# Patient Record
Sex: FEMALE | Race: BLACK OR AFRICAN AMERICAN | Employment: FULL TIME | URBAN - METROPOLITAN AREA
[De-identification: names, ages, dates, MRNs, and addresses within clinical notes are randomized per-mention and may not be internally consistent; named-entity substitution may affect disease eponyms.]

---

## 2017-07-31 ENCOUNTER — OFFICE VISIT (OUTPATIENT)
Dept: SURGERY | Age: 62
End: 2017-07-31

## 2017-07-31 VITALS
OXYGEN SATURATION: 100 % | BODY MASS INDEX: 40.32 KG/M2 | HEART RATE: 70 BPM | DIASTOLIC BLOOD PRESSURE: 81 MMHG | SYSTOLIC BLOOD PRESSURE: 135 MMHG | WEIGHT: 242 LBS | HEIGHT: 65 IN | RESPIRATION RATE: 16 BRPM

## 2017-07-31 DIAGNOSIS — M54.9 CHRONIC BACK PAIN, UNSPECIFIED BACK LOCATION, UNSPECIFIED BACK PAIN LATERALITY: ICD-10-CM

## 2017-07-31 DIAGNOSIS — E66.01 MORBID OBESITY DUE TO EXCESS CALORIES (HCC): Primary | ICD-10-CM

## 2017-07-31 DIAGNOSIS — E78.00 HYPERCHOLESTEROLEMIA: ICD-10-CM

## 2017-07-31 DIAGNOSIS — M25.569 ARTHRALGIA OF KNEE, UNSPECIFIED LATERALITY: ICD-10-CM

## 2017-07-31 DIAGNOSIS — K21.9 GASTROESOPHAGEAL REFLUX DISEASE WITHOUT ESOPHAGITIS: ICD-10-CM

## 2017-07-31 DIAGNOSIS — G89.29 CHRONIC BACK PAIN, UNSPECIFIED BACK LOCATION, UNSPECIFIED BACK PAIN LATERALITY: ICD-10-CM

## 2017-07-31 DIAGNOSIS — E66.01 MORBID OBESITY WITH BMI OF 40.0-44.9, ADULT (HCC): ICD-10-CM

## 2017-07-31 NOTE — PATIENT INSTRUCTIONS
Body Mass Index: Care Instructions  Your Care Instructions    Body mass index (BMI) can help you see if your weight is raising your risk for health problems. It uses a formula to compare how much you weigh with how tall you are. · A BMI lower than 18.5 is considered underweight. · A BMI between 18.5 and 24.9 is considered healthy. · A BMI between 25 and 29.9 is considered overweight. A BMI of 30 or higher is considered obese. If your BMI is in the normal range, it means that you have a lower risk for weight-related health problems. If your BMI is in the overweight or obese range, you may be at increased risk for weight-related health problems, such as high blood pressure, heart disease, stroke, arthritis or joint pain, and diabetes. If your BMI is in the underweight range, you may be at increased risk for health problems such as fatigue, lower protection (immunity) against illness, muscle loss, bone loss, hair loss, and hormone problems. BMI is just one measure of your risk for weight-related health problems. You may be at higher risk for health problems if you are not active, you eat an unhealthy diet, or you drink too much alcohol or use tobacco products. Follow-up care is a key part of your treatment and safety. Be sure to make and go to all appointments, and call your doctor if you are having problems. It's also a good idea to know your test results and keep a list of the medicines you take. How can you care for yourself at home? · Practice healthy eating habits. This includes eating plenty of fruits, vegetables, whole grains, lean protein, and low-fat dairy. · If your doctor recommends it, get more exercise. Walking is a good choice. Bit by bit, increase the amount you walk every day. Try for at least 30 minutes on most days of the week. · Do not smoke. Smoking can increase your risk for health problems. If you need help quitting, talk to your doctor about stop-smoking programs and medicines. These can increase your chances of quitting for good. · Limit alcohol to 2 drinks a day for men and 1 drink a day for women. Too much alcohol can cause health problems. If you have a BMI higher than 25  · Your doctor may do other tests to check your risk for weight-related health problems. This may include measuring the distance around your waist. A waist measurement of more than 40 inches in men or 35 inches in women can increase the risk of weight-related health problems. · Talk with your doctor about steps you can take to stay healthy or improve your health. You may need to make lifestyle changes to lose weight and stay healthy, such as changing your diet and getting regular exercise. If you have a BMI lower than 18.5  · Your doctor may do other tests to check your risk for health problems. · Talk with your doctor about steps you can take to stay healthy or improve your health. You may need to make lifestyle changes to gain or maintain weight and stay healthy, such as getting more healthy foods in your diet and doing exercises to build muscle. Where can you learn more? Go to http://freddie-derek.info/. Enter S176 in the search box to learn more about \"Body Mass Index: Care Instructions. \"  Current as of: January 23, 2017  Content Version: 11.3  © 2353-9521 [employer]. Care instructions adapted under license by [employer] (which disclaims liability or warranty for this information). If you have questions about a medical condition or this instruction, always ask your healthcare professional. [place] [address] any warranty or liability for your use of this information.

## 2017-07-31 NOTE — PROGRESS NOTES
Bariatric Surgery Consultation    Subjective: Delvin Oliveira is a 59 y.o. obese female with a Body mass index is 40.1 kg/(m^2). .  she desires surgery at this time because of health issues and quality of life issues. Delvin Oliveira has tried multiple diets in her lifetime most recently tried physician supervised, behavior modification and unsupervised diets. Bariatric comorbidities present are   Patient Active Problem List   Diagnosis Code    Morbid obesity with BMI of 40.0-44.9, adult ([facility] [address] E66.01, Z68.41    Hypercholesterolemia E78.00    Chronic back pain M54.9, G89.29    Snores R06.83    Morbid obesity (HCC) E66.01    GERD (gastroesophageal reflux disease) K21.9    Arthritic-like pain M25.50     The patient desires laparoscopic sleeve gastrectomy for surgical weight loss, however she is not currently a surgical candidate due to need for review of all criteria. Delvin Oliveira is here today to check progress with weight loss / evaluate nutritional status and review all subspecialty clearances in hopes of proceeding to the operating room. Patient Active Problem List    Diagnosis Date Noted    Morbid obesity ([address]     GERD (gastroesophageal reflux disease)     Arthritic-like pain     Morbid obesity with BMI of 40.0-44.9, adult ([facility] [address]     Hypercholesterolemia     Chronic back pain     Snores       Past Surgical History:   Procedure Laterality Date    HX LIPOSUCTION      HX TOTAL ABDOMINAL HYSTERECTOMY        Social History   Substance Use Topics    Smoking status: Never Smoker    Smokeless tobacco: Never Used    Alcohol use No      No family history on file.      No Known Allergies       Review of Systems:        General - No history or complaints of unexpected fever, chills, or weight loss  Head/Neck - No history or complaints of headache, diplopia, dysphagia, hearing loss  Cardiac - No history or complaints of chest pain, palpitations, murmur, or shortness of breath  Pulmonary - No history or complaints of shortness of breath, productive cough, hemoptysis  Gastrointestinal - (+) GERD, No history or complaints of abdominal pain, obstipation/constipation, blood per rectum  Genitourinary - No history or complaints of hematuria/dysuria, stress urinary incontinence symptoms, or renal lithiasis  Musculoskeletal - (+) arthritic like pains  Hematologic - No history or complaints of bleeding disorders, blood transfusions, sickle cell anemia  Neurologic - No history or complaints of  migraine headaches, seizure activity, syncopal episodes, TIA or stroke  Integumentary - No history or complaints of rashes, abnormal nevi, skin cancer  Gynecological - post hysterectomy    Objective:     Visit Vitals    /81 (BP 1 Location: Left arm, BP Patient Position: Sitting)    Pulse 70    Resp 16    Ht 5' 5\" (1.651 m)    Wt 109.3 kg (241 lb)    SpO2 100%    BMI 40.1 kg/m2       Physical Examination: General appearance - alert, well appearing, and in no distress  Mental status - alert, oriented to person, place, and time  Eyes - pupils equal and reactive, extraocular eye movements intact  Ears - bilateral TM's and external ear canals normal  Nose - normal and patent, no erythema, discharge or polyps  Mouth - mucous membranes moist, pharynx normal without lesions  Neck - supple, no significant adenopathy  Lymphatics - no palpable lymphadenopathy, no hepatosplenomegaly  Chest - clear to auscultation, no wheezes, rales or rhonchi, symmetric air entry  Heart - normal rate, regular rhythm, normal S1, S2, no murmurs, rubs, clicks or gallops  Abdomen - soft, nontender, nondistended, no masses or organomegaly  Back exam - full range of motion, no tenderness, palpable spasm or pain on motion  Neurological - alert, oriented, normal speech, no focal findings or movement disorder noted  Musculoskeletal - no joint tenderness, deformity or swelling  Extremities - peripheral pulses normal, no pedal edema, no clubbing or cyanosis  Skin - normal coloration and turgor, no rashes, no suspicious skin lesions noted    Labs:             Assessment:     Morbid obesity with associated comorbidity & need for review of all COVACARE criteria    Plan:     Continuation of Pre-Operative evaluation / clearance. Stepan Ramirez has returned to the office today to discuss her status as a surgical candidate.  her progress has been noted and reviewed. We will continue the pre-operative process and work towards goals as outlined. she has NA more pounds to lose before proceeding to the OR. (1 pounds lost since initial consult visit 12 months ago)  she has NA more nutritional visits to complete before proceeding to the OR  she has an outstanding cardiac clearance to review before proceeding to the OR. Stepan Ramirez understand the rationales for all the above. It has been discussed that given her obese condition that the best surgical option for this patient would be the laparoscopic sleeve gastrectomy. Stepan Ramirez agrees with the surgical choice and has been educated in it's; risks, benefits, and alternatives. We will continue with the pre-operative evaluation as needed to check progress. The patient understands the plan of action    The patient is a good surgical candidate to proceed with sleeve resection pending a satisfactory cardiac clearance. Secondary Diagnoses:     Dietary Intervention  - The patient is currently scheduled to see or has been followed by a bariatric nutritionist for an attempt at preoperative weight loss as has been dictated by their insurance carrier. They will be assessed at various times during their follow up to evaluate their progress depending on the length of time that is required once again by their carrier.   I have explained the importance of preoperative weight loss and the benefits regarding lower surgical risk and also assisting the patient in reaching their weight loss goal.  Finally they understand their is a physiologic benefit from the standpoint of hepatic volume reduction preoperatively.   I have reiterated the importance of a low carbohydrate and high protein regimen to achieve their stated goal.      Signed By: Thi Bull MD     July 31, 2017

## 2017-08-04 DIAGNOSIS — E66.01 MORBID OBESITY WITH BMI OF 40.0-44.9, ADULT (HCC): Primary | ICD-10-CM

## 2017-08-04 DIAGNOSIS — Z01.812 BLOOD TESTS PRIOR TO TREATMENT OR PROCEDURE: ICD-10-CM

## 2017-08-04 DIAGNOSIS — K21.9 GASTROESOPHAGEAL REFLUX DISEASE WITHOUT ESOPHAGITIS: ICD-10-CM

## 2017-08-10 ENCOUNTER — HOSPITAL ENCOUNTER (OUTPATIENT)
Dept: NON INVASIVE DIAGNOSTICS | Age: 62
Discharge: HOME | End: 2017-08-10
Attending: SPECIALIST

## 2017-08-10 DIAGNOSIS — E66.01 MORBID OBESITY DUE TO EXCESS CALORIES (HCC): ICD-10-CM

## 2017-08-10 LAB
ATTENDING PHYSICIAN, CST07: NORMAL
DIAGNOSIS, 93000: NORMAL
DUKE TM SCORE RESULT, CST14: NORMAL
DUKE TREADMILL SCORE, CST13: NORMAL
ECG INTERP BEFORE EX, CST11: NORMAL
ECG INTERP DURING EX, CST12: NORMAL
FUNCTIONAL CAPACITY, CST17: NORMAL
KNOWN CARDIAC CONDITION, CST08: NORMAL
MAX. DIASTOLIC BP, CST04: 62 MMHG
MAX. HEART RATE, CST05: 134 BPM
MAX. SYSTOLIC BP, CST03: 158 MMHG
OVERALL BP RESPONSE TO EXERCISE, CST16: NORMAL
OVERALL HR RESPONSE TO EXERCISE, CST15: NORMAL
PEAK EX METS, CST10: 9.7 METS
PROTOCOL NAME, CST01: NORMAL
TEST INDICATION, CST09: NORMAL

## 2017-08-18 RX ORDER — OXYCODONE AND ACETAMINOPHEN 5; 325 MG/1; MG/1
1 TABLET ORAL
Qty: 30 TAB | Refills: 0 | Status: ON HOLD | OUTPATIENT
Start: 2017-08-18 | End: 2017-08-29

## 2017-08-18 RX ORDER — PHENOL/SODIUM PHENOLATE
20 AEROSOL, SPRAY (ML) MUCOUS MEMBRANE DAILY
Qty: 30 TAB | Refills: 1 | Status: ON HOLD | OUTPATIENT
Start: 2017-08-18 | End: 2017-08-29

## 2017-08-21 ENCOUNTER — HOSPITAL ENCOUNTER (OUTPATIENT)
Dept: PREADMISSION TESTING | Age: 62
Discharge: HOME | End: 2017-08-21
Payer: COMMERCIAL

## 2017-08-21 ENCOUNTER — OFFICE VISIT (OUTPATIENT)
Dept: SURGERY | Age: 62
End: 2017-08-21

## 2017-08-21 ENCOUNTER — NURSE NAVIGATOR (OUTPATIENT)
Dept: SURGERY | Age: 62
End: 2017-08-21

## 2017-08-21 VITALS
DIASTOLIC BLOOD PRESSURE: 74 MMHG | SYSTOLIC BLOOD PRESSURE: 116 MMHG | BODY MASS INDEX: 41.66 KG/M2 | OXYGEN SATURATION: 100 % | HEIGHT: 64 IN | HEART RATE: 77 BPM | RESPIRATION RATE: 16 BRPM | WEIGHT: 244 LBS

## 2017-08-21 DIAGNOSIS — E78.00 HYPERCHOLESTEROLEMIA: ICD-10-CM

## 2017-08-21 DIAGNOSIS — M25.569 ARTHRALGIA OF KNEE, UNSPECIFIED LATERALITY: ICD-10-CM

## 2017-08-21 DIAGNOSIS — E66.01 MORBID OBESITY DUE TO EXCESS CALORIES (HCC): Primary | ICD-10-CM

## 2017-08-21 DIAGNOSIS — Z01.812 BLOOD TESTS PRIOR TO TREATMENT OR PROCEDURE: ICD-10-CM

## 2017-08-21 DIAGNOSIS — K21.9 GASTROESOPHAGEAL REFLUX DISEASE WITHOUT ESOPHAGITIS: ICD-10-CM

## 2017-08-21 DIAGNOSIS — E66.01 MORBID OBESITY WITH BMI OF 40.0-44.9, ADULT (HCC): Primary | ICD-10-CM

## 2017-08-21 DIAGNOSIS — E66.01 MORBID OBESITY WITH BMI OF 40.0-44.9, ADULT (HCC): ICD-10-CM

## 2017-08-21 DIAGNOSIS — M54.9 CHRONIC BACK PAIN, UNSPECIFIED BACK LOCATION, UNSPECIFIED BACK PAIN LATERALITY: ICD-10-CM

## 2017-08-21 DIAGNOSIS — E66.01 MORBID OBESITY WITH BODY MASS INDEX OF 40.0-49.9 (HCC): ICD-10-CM

## 2017-08-21 DIAGNOSIS — G89.29 CHRONIC BACK PAIN, UNSPECIFIED BACK LOCATION, UNSPECIFIED BACK PAIN LATERALITY: ICD-10-CM

## 2017-08-21 LAB
ABO + RH BLD: NORMAL
ALBUMIN SERPL-MCNC: 3.4 G/DL (ref 3.4–5)
ALBUMIN/GLOB SERPL: 1 {RATIO} (ref 0.8–1.7)
ALP SERPL-CCNC: 99 U/L (ref 45–117)
ALT SERPL-CCNC: 14 U/L (ref 13–56)
ANION GAP SERPL CALC-SCNC: 8 MMOL/L (ref 3–18)
AST SERPL-CCNC: 11 U/L (ref 15–37)
BASOPHILS # BLD: 0 K/UL (ref 0–0.06)
BASOPHILS NFR BLD: 0 % (ref 0–2)
BILIRUB SERPL-MCNC: 0.3 MG/DL (ref 0.2–1)
BLOOD GROUP ANTIBODIES SERPL: NORMAL
BUN SERPL-MCNC: 10 MG/DL (ref 7–18)
BUN/CREAT SERPL: 13 (ref 12–20)
CALCIUM SERPL-MCNC: 8.8 MG/DL (ref 8.5–10.1)
CHLORIDE SERPL-SCNC: 108 MMOL/L (ref 100–108)
CO2 SERPL-SCNC: 27 MMOL/L (ref 21–32)
CREAT SERPL-MCNC: 0.77 MG/DL (ref 0.6–1.3)
DIFFERENTIAL METHOD BLD: NORMAL
EOSINOPHIL # BLD: 0.3 K/UL (ref 0–0.4)
EOSINOPHIL NFR BLD: 5 % (ref 0–5)
ERYTHROCYTE [DISTWIDTH] IN BLOOD BY AUTOMATED COUNT: 13.5 % (ref 11.6–14.5)
GLOBULIN SER CALC-MCNC: 3.3 G/DL (ref 2–4)
GLUCOSE SERPL-MCNC: 93 MG/DL (ref 74–99)
HCT VFR BLD AUTO: 37.6 % (ref 35–45)
HGB BLD-MCNC: 12.3 G/DL (ref 12–16)
LYMPHOCYTES # BLD: 2.2 K/UL (ref 0.9–3.6)
LYMPHOCYTES NFR BLD: 36 % (ref 21–52)
MCH RBC QN AUTO: 27 PG (ref 24–34)
MCHC RBC AUTO-ENTMCNC: 32.7 G/DL (ref 31–37)
MCV RBC AUTO: 82.6 FL (ref 74–97)
MONOCYTES # BLD: 0.4 K/UL (ref 0.05–1.2)
MONOCYTES NFR BLD: 6 % (ref 3–10)
NEUTS SEG # BLD: 3.2 K/UL (ref 1.8–8)
NEUTS SEG NFR BLD: 53 % (ref 40–73)
PLATELET # BLD AUTO: 302 K/UL (ref 135–420)
PMV BLD AUTO: 10 FL (ref 9.2–11.8)
POTASSIUM SERPL-SCNC: 4.3 MMOL/L (ref 3.5–5.5)
PROT SERPL-MCNC: 6.7 G/DL (ref 6.4–8.2)
RBC # BLD AUTO: 4.55 M/UL (ref 4.2–5.3)
SODIUM SERPL-SCNC: 143 MMOL/L (ref 136–145)
SPECIMEN EXP DATE BLD: NORMAL
WBC # BLD AUTO: 6 K/UL (ref 4.6–13.2)

## 2017-08-21 PROCEDURE — 86900 BLOOD TYPING SEROLOGIC ABO: CPT | Performed by: SPECIALIST

## 2017-08-21 PROCEDURE — 85025 COMPLETE CBC W/AUTO DIFF WBC: CPT | Performed by: SPECIALIST

## 2017-08-21 PROCEDURE — 36415 COLL VENOUS BLD VENIPUNCTURE: CPT | Performed by: SPECIALIST

## 2017-08-21 PROCEDURE — 93005 ELECTROCARDIOGRAM TRACING: CPT

## 2017-08-21 PROCEDURE — 80053 COMPREHEN METABOLIC PANEL: CPT | Performed by: SPECIALIST

## 2017-08-21 NOTE — PATIENT INSTRUCTIONS
Body Mass Index: Care Instructions  Your Care Instructions    Body mass index (BMI) can help you see if your weight is raising your risk for health problems. It uses a formula to compare how much you weigh with how tall you are. · A BMI lower than 18.5 is considered underweight. · A BMI between 18.5 and 24.9 is considered healthy. · A BMI between 25 and 29.9 is considered overweight. A BMI of 30 or higher is considered obese. If your BMI is in the normal range, it means that you have a lower risk for weight-related health problems. If your BMI is in the overweight or obese range, you may be at increased risk for weight-related health problems, such as high blood pressure, heart disease, stroke, arthritis or joint pain, and diabetes. If your BMI is in the underweight range, you may be at increased risk for health problems such as fatigue, lower protection (immunity) against illness, muscle loss, bone loss, hair loss, and hormone problems. BMI is just one measure of your risk for weight-related health problems. You may be at higher risk for health problems if you are not active, you eat an unhealthy diet, or you drink too much alcohol or use tobacco products. Follow-up care is a key part of your treatment and safety. Be sure to make and go to all appointments, and call your doctor if you are having problems. It's also a good idea to know your test results and keep a list of the medicines you take. How can you care for yourself at home? · Practice healthy eating habits. This includes eating plenty of fruits, vegetables, whole grains, lean protein, and low-fat dairy. · If your doctor recommends it, get more exercise. Walking is a good choice. Bit by bit, increase the amount you walk every day. Try for at least 30 minutes on most days of the week. · Do not smoke. Smoking can increase your risk for health problems. If you need help quitting, talk to your doctor about stop-smoking programs and medicines. These can increase your chances of quitting for good. · Limit alcohol to 2 drinks a day for men and 1 drink a day for women. Too much alcohol can cause health problems. If you have a BMI higher than 25  · Your doctor may do other tests to check your risk for weight-related health problems. This may include measuring the distance around your waist. A waist measurement of more than 40 inches in men or 35 inches in women can increase the risk of weight-related health problems. · Talk with your doctor about steps you can take to stay healthy or improve your health. You may need to make lifestyle changes to lose weight and stay healthy, such as changing your diet and getting regular exercise. If you have a BMI lower than 18.5  · Your doctor may do other tests to check your risk for health problems. · Talk with your doctor about steps you can take to stay healthy or improve your health. You may need to make lifestyle changes to gain or maintain weight and stay healthy, such as getting more healthy foods in your diet and doing exercises to build muscle. Where can you learn more? Go to http://freddie-derek.info/. Enter S176 in the search box to learn more about \"Body Mass Index: Care Instructions. \"  Current as of: January 23, 2017  Content Version: 11.3  © 8803-4919 [employer]. Care instructions adapted under license by [employer] (which disclaims liability or warranty for this information). If you have questions about a medical condition or this instruction, always ask your healthcare professional. [place] [address] any warranty or liability for your use of this information. Preparation for Surgery  Refer to your book for specific instructions    1. Stop taking all aspirin products, ibuprofen products, non-steroidal medications, blood thinners,  and herbal supplements as outlined in your book. 2. Absolutely no smoking.      3. If diabetic, monitor blood sugars regularly and alert the office of blood sugars over 200.    4. Have a supply of protein product and liquid diet items for your first two weeks as outlined in your book. 5. The day before your surgery is scheduled:  6.    Gastric Bypass and Sleeve:  Clear liquids and Protein Shakes     Gastric Band:   Eat lightly. No snacking.  Drink lots of water         6. Get prepared to meet a new you!

## 2017-08-21 NOTE — PROGRESS NOTES
Sleeve Gastrectomy - History and Physical    Subjective: The patient is a 59 y.o. obese female with a Body mass index is 41.88 kg/(m^2). .   she presents now to review their work up to date to see if they are a candidate for surgery and whether or not to proceed with the previously requested procedure. Bariatric comorbidities continue to include:   Patient Active Problem List   Diagnosis Code    Morbid obesity with BMI of 40.0-44.9, adult ([facility] E66.01, Z68.41    Hypercholesterolemia E78.00    Chronic back pain M54.9, G89.29    Snores R06.83    Morbid obesity ([facility]) E66.01    GERD (gastroesophageal reflux disease) K21.9    Arthritic-like pain M25.50    Morbid obesity with body mass index of 40.0-49.9 ([facility]) E66.01       They have been generally well prior to this visit and have had no recent significant illnesses. The patient has had no gastrointestinal issues that would preclude them from proceeding with the surgery they have chosen. Lucas Ardon has recently tried a preoperative weight loss program  in addition to seeing a bariatric nutritionist preoperatively. We have discussed on at least one other occasion about the various types of surgical weight loss procedures and they have considered these options after our initial consultation. We have once again discussed these procedures in detail and they have now decided on a surgical procedure. They present today to discuss this and confirm that their evaluation pre operatively is acceptable to continue with surgery. The patient desires laparoscopic sleeve gastrectomy for surgical weight loss. The patients goal weight is 157 lb. (this represents a BMI of 27)    These goals are consistent with expected outcomes of their desired operation. her Medical goals are resolution of these health issues.     Patient Active Problem List    Diagnosis Date Noted    Morbid obesity with body mass index of 40.0-49.9 ([facility]     Morbid obesity ([facility]     GERD (gastroesophageal reflux disease)     Arthritic-like pain     Morbid obesity with BMI of 40.0-44.9, adult ([facility])     Hypercholesterolemia     Chronic back pain     Snores      Past Surgical History:   Procedure Laterality Date    HX LIPOSUCTION      HX TOTAL ABDOMINAL HYSTERECTOMY        Social History   Substance Use Topics    Smoking status: Never Smoker    Smokeless tobacco: Never Used    Alcohol use 0.0 oz/week     0 Standard drinks or equivalent per week      Comment: rare on Holidays      History reviewed. No pertinent family history. Current Outpatient Prescriptions   Medication Sig Dispense Refill    oxyCODONE-acetaminophen (PERCOCET) 5-325 mg per tablet Take 1 Tab by mouth every four (4) hours as needed for Pain. Max Daily Amount: 6 Tabs. 30 Tab 0    Omeprazole delayed release (PRILOSEC D/R) 20 mg tablet Take 1 Tab by mouth daily.  OTC 30 Tab 1     No Known Allergies     Review of Systems:     General - No history or complaints of unexpected fever, chills, or weight loss  Head/Neck - No history or complaints of headache, diplopia, dysphagia, hearing loss  Cardiac - No history or complaints of chest pain, palpitations, murmur, or shortness of breath  Pulmonary - No history or complaints of shortness of breath, productive cough, hemoptysis  Gastrointestinal - (+) GERD, No history or complaints of abdominal pain, obstipation/constipation, blood per rectum  Genitourinary - No history or complaints of hematuria/dysuria, stress urinary incontinence symptoms, or renal lithiasis  Musculoskeletal - (+) arthritic like pains  Hematologic - No history or complaints of bleeding disorders, blood transfusions, sickle cell anemia  Neurologic - No history or complaints of  migraine headaches, seizure activity, syncopal episodes, TIA or stroke  Integumentary - No history or complaints of rashes, abnormal nevi, skin cancer  Gynecological - post hysterectomy    Objective:     Visit Vitals    /74 (BP 1 Location: Left arm, BP Patient Position: Sitting)    Pulse 77    Resp 16    Ht 5' 4\" (1.626 m)    Wt 110.7 kg (244 lb)    SpO2 100%    BMI 41.88 kg/m2       Physical Examination: General appearance - alert, well appearing, and in no distress  Mental status - alert, oriented to person, place, and time  Eyes - pupils equal and reactive, extraocular eye movements intact  Ears - bilateral TM's and external ear canals normal  Nose - normal and patent, no erythema, discharge or polyps  Mouth - mucous membranes moist, pharynx normal without lesions  Neck - supple, no significant adenopathy  Lymphatics - no palpable lymphadenopathy, no hepatosplenomegaly  Chest - clear to auscultation, no wheezes, rales or rhonchi, symmetric air entry  Heart - normal rate, regular rhythm, normal S1, S2, no murmurs, rubs, clicks or gallops  Abdomen - soft, nontender, nondistended, no masses or organomegaly  Back exam - full range of motion, no tenderness, palpable spasm or pain on motion  Neurological - alert, oriented, normal speech, no focal findings or movement disorder noted  Musculoskeletal - no joint tenderness, deformity or swelling  Extremities - peripheral pulses normal, no pedal edema, no clubbing or cyanosis  Skin - normal coloration and turgor, no rashes, no suspicious skin lesions noted    Labs / Preoperative Evaluation:        Recent Results (from the past 1008 hour(s))   STRESS TEST CARDIAC    Collection Time: 08/10/17 11:15 AM   Result Value Ref Range    Diagnosis       Normal stress test.    Confirmed by Atul Vallejo ([address]),  [place], [facility] ([place]) ([id number]) on 8/10/2017   1:07:56 PM      Test indication CARDIAC CLEARANCE     Functional capacity average     ECG Interp. Before Exercise NSR     ECG Interp. During Exercise none     Overall HR response to exercise      Overall BP response to exercise      Max. Systolic  mmHg    Max. Diastolic BP 62 mmHg    Max.  Heart rate 134 BPM    [facility] treadmill score      Gutiérrez TM score result      [facility] Ex METs 9.7 METS    Protocol name MUNIR                Known cardiac condition      Attending physician SABA BARAHONA, MADHU    EKG, 12 LEAD, INITIAL    Collection Time: 08/21/17  1:03 PM   Result Value Ref Range    Ventricular Rate 59 BPM    Atrial Rate 59 BPM    P-R Interval 172 ms    QRS Duration 80 ms    Q-T Interval 456 ms    QTC Calculation (Bezet) 451 ms    Calculated P Axis 58 degrees    Calculated R Axis 50 degrees    Calculated T Axis 28 degrees    Diagnosis       Sinus bradycardia  Otherwise normal ECG  No previous ECGs available     CBC WITH AUTOMATED DIFF    Collection Time: 08/21/17  1:16 PM   Result Value Ref Range    WBC 6.0 4.6 - 13.2 K/uL    RBC 4.55 4.20 - 5.30 M/uL    HGB 12.3 12.0 - 16.0 g/dL    HCT 37.6 35.0 - 45.0 %    MCV 82.6 74.0 - 97.0 FL    MCH 27.0 24.0 - 34.0 PG    MCHC 32.7 31.0 - 37.0 g/dL    RDW 13.5 11.6 - 14.5 %    PLATELET 156 558 - 204 K/uL    MPV 10.0 9.2 - 11.8 FL    NEUTROPHILS 53 40 - 73 %    LYMPHOCYTES 36 21 - 52 %    MONOCYTES 6 3 - 10 %    EOSINOPHILS 5 0 - 5 %    BASOPHILS 0 0 - 2 %    ABS. NEUTROPHILS 3.2 1.8 - 8.0 K/UL    ABS. LYMPHOCYTES 2.2 0.9 - 3.6 K/UL    ABS. MONOCYTES 0.4 0.05 - 1.2 K/UL    ABS. EOSINOPHILS 0.3 0.0 - 0.4 K/UL    ABS. BASOPHILS 0.0 0.0 - 0.06 K/UL    DF AUTOMATED     METABOLIC PANEL, COMPREHENSIVE    Collection Time: 08/21/17  1:16 PM   Result Value Ref Range    Sodium 143 136 - 145 mmol/L    Potassium 4.3 3.5 - 5.5 mmol/L    Chloride 108 100 - 108 mmol/L    CO2 27 21 - 32 mmol/L    Anion gap 8 3.0 - 18 mmol/L    Glucose 93 74 - 99 mg/dL    BUN 10 7.0 - 18 MG/DL    Creatinine 0.77 0.6 - 1.3 MG/DL    BUN/Creatinine ratio 13 12 - 20      GFR est AA >60 >60 ml/min/1.73m2    GFR est non-AA >60 >60 ml/min/1.73m2    Calcium 8.8 8.5 - 10.1 MG/DL    Bilirubin, total 0.3 0.2 - 1.0 MG/DL    ALT (SGPT) 14 13 - 56 U/L    AST (SGOT) 11 (L) 15 - 37 U/L    Alk.  phosphatase 99 45 - 117 U/L    Protein, total 6.7 6.4 - 8.2 g/dL    Albumin 3.4 3.4 - 5.0 g/dL    Globulin 3.3 2.0 - 4.0 g/dL    A-G Ratio 1.0 0.8 - 1.7     TYPE & SCREEN    Collection Time: 08/21/17  1:16 PM   Result Value Ref Range    Crossmatch Expiration 09/01/2017     ABO/Rh(D) O POSITIVE     Antibody screen NEG        Assessment:     Morbid obesity with comorbidity    Plan:     laparoscopic sleeve gastrectomy    This is a 59 y.o. female with a BMI of Body mass index is 41.88 kg/(m^2). and the weight-related co-morbidties as noted above. Mary Grace Mendoza meets the NIH criteria for bariatric surgery based upon the BMI of Body mass index is 41.88 kg/(m^2). and multiple weight-related co-morbidties. Mary Grace Mendoza has elected laparoscopic sleeve gastrectomy as her intervention of choice for treatment of morbid obestiy through surgical means secondary to its safety profile, rapid return to work  and decreases in operative risks over gastric bypass. In the office today, following Maira's history and physical examination, a 40 minute discussion regarding the anatomic alterations for the laparoscopic sleeve gastrectomy was undertaken. The dietary expectations and the patient  dependent factors for success were thoroughly discussed, to include the need for interval follow-up and long-term dietary changes associated with success. The possible complications of the sleeve gastrectomy  were also discussed, to include;death, DVT/PE, staple line leak, bleeding, stricture formation, infection, nutritional deficiencies and sleeve dilation. Specific weight related outcomes for success were also discussed with an emphasis on careful and close follow-up with the first year and eating behavior modification as the baseline and cyclical hunger return. The patient expressed an understanding of the above factors, and her questions were answered in their entirety.     In addition, the patient attended a 1.5 hour power point seminar regarding obesity, surgical weight loss including, adjustable gastric band, gastric bypass, and sleeve gastrectomy. This discussion contrasted the different surgical techniques, mechanisms of actions and expected outcomes, and surgical and medical risks associated with each procedure. During this seminar, there was a long question and answer session where each questions was answered until there were no additional questions. Today, the patient had all of her questions answered and the decision was made today that the patient's preoperative evaluation is acceptable for them  to proceed with bariatric surgery  choosing the sleeve gastrectomy as her surgical option. The patient understands the plan of action    Since the patient's original consult one month ago they have been seen by their cardilogist for cardiac clearance. There has been no change to their overall medical or surgical history and they have been on no steroids in any form. Normal stress test scanned under media tab    Secondary Diagnoses:     DVT / Pulmonary Embolus Risk - The patient is at a higher risk for post operative DVT / pulmonary embolus secondary to their morbid obese status, relative sedentary lifestyle, and impending general anesthetic. We will plan to use anticoagulation therapy pre and post operative as well as  pneumatic compression devices and encourage ambulation once on the hospital nursing floor. The need for possible at home anticoagulation therapy has also been discussed and any decision on this matter will be made during post operative evaluations. The patient understands that their efforts at ambulation are of vital importance to reduce the risk of this complication thus placing significant burden on them as to the prevention of such issues. Signs and symptoms of DVT / PE have been discussed with the patient and they have been instructed to call the office if any these occur in the \"at home\" post op phase.     Hyperlipidemia - The patient understands that studies show that almost all patient will realize an improvement in their lipid profile with weight loss that occurs with these procedures. They however also understand that hyperlipidemia is a multifactorial disease particularly as it pertains to their genetic background and that there is no guarantee toward cure  of this issue. We will resume their medications immediately postoperatively as this tends to decrease any post operative cardiac events.  The patient will follow up with their family physician in the postoperative period with plans to repeat their lipid panel 2-3 month postoperative for potential adjustment or removal of these medications. GERD -The patient understands that weight loss surgery is not a guaranteed cure for reflux disease but does understand the benefits that weight loss can have on reflux disease.  They also understand that at the time of surgery the gastroesophageal junction will be evaluated for the presence of a diaphragmatic hernia.  Hernias will be corrected always with the gastric band and sleeve gastrectomy procedures, but only on a case by case basis with the gastric bypass if it prevents our ability to perform the operation at hand, or if I feel that they would benefit long term with correction of this issue.  The patient also understands that neither weight loss surgery nor repair of a diaphragmatic hernia repair guarantees the complete cessation of the disease. They also understand there is a possibility of recurrence with a simple crural repair as is performed with these procedures. They understand they may have to continue their medications in the postoperative period. They have a good understanding that the gastric bypass procedure is better suited to total resolution of this issue and that neither the Lap Band nor sleeve gastrectomy is considered a curative procedure as it pertains to this diagnosis.     Weight Related Arthritis -The patient understands the benefits that weight loss surgery can have on their arthritis but also understands that weight loss is not a guaranteed cure and relief of symptoms is often dependent on the severity of the underlying disease.  The patient also understands that traditional pharmaceutical treatments for this diagnosis are usually unavailable to post-operative weight loss patients due to the effects on the gastrointestinal tract particularly with the gastric bypass and to a lesser effect with the sleeve gastrectomy.  Any changes to the patients medication treatment will ultimately be made the patients PCP with input by our office.         Signed By: Glo Nur MD     August 21, 2017

## 2017-08-21 NOTE — PROGRESS NOTES
Appears to have a good understanding of the diet progression, food choices, and dietary/exercise habits for successful weight loss and nourishment after surgery. The class material included: post-op diet progression, including liquid, pureed, and low fat, low sugar food recommendations; proper food group choices, and encouraging dietary and exercise habits that lead to weight loss success.      Tulio Garcia RD

## 2017-08-22 LAB
ATRIAL RATE: 59 BPM
CALCULATED P AXIS, ECG09: 58 DEGREES
CALCULATED R AXIS, ECG10: 50 DEGREES
CALCULATED T AXIS, ECG11: 28 DEGREES
DIAGNOSIS, 93000: NORMAL
P-R INTERVAL, ECG05: 172 MS
Q-T INTERVAL, ECG07: 456 MS
QRS DURATION, ECG06: 80 MS
QTC CALCULATION (BEZET), ECG08: 451 MS
VENTRICULAR RATE, ECG03: 59 BPM

## 2017-08-29 ENCOUNTER — ANESTHESIA (OUTPATIENT)
Dept: SURGERY | Age: 62
DRG: 621 | End: 2017-08-29
Payer: COMMERCIAL

## 2017-08-29 ENCOUNTER — HOSPITAL ENCOUNTER (INPATIENT)
Age: 62
LOS: 1 days | Discharge: HOME | DRG: 621 | End: 2017-08-30
Attending: SPECIALIST | Admitting: SPECIALIST
Payer: COMMERCIAL

## 2017-08-29 ENCOUNTER — ANESTHESIA EVENT (OUTPATIENT)
Dept: SURGERY | Age: 62
DRG: 621 | End: 2017-08-29
Payer: COMMERCIAL

## 2017-08-29 PROCEDURE — 77030013079 HC BLNKT BAIR HGGR 3M -A: Performed by: NURSE ANESTHETIST, CERTIFIED REGISTERED

## 2017-08-29 PROCEDURE — 77030002935 HC SUT MCRYL J&J -C: Performed by: SPECIALIST

## 2017-08-29 PROCEDURE — 76010000153 HC OR TIME 1.5 TO 2 HR: Performed by: SPECIALIST

## 2017-08-29 PROCEDURE — 77030013567 HC DRN WND RESERV BARD -A: Performed by: SPECIALIST

## 2017-08-29 PROCEDURE — 77030002966 HC SUT PDS J&J -A: Performed by: SPECIALIST

## 2017-08-29 PROCEDURE — 77030012893: Performed by: SPECIALIST

## 2017-08-29 PROCEDURE — 77030002916 HC SUT ETHLN J&J -A: Performed by: SPECIALIST

## 2017-08-29 PROCEDURE — 88342 IMHCHEM/IMCYTCHM 1ST ANTB: CPT | Performed by: SPECIALIST

## 2017-08-29 PROCEDURE — 74011250636 HC RX REV CODE- 250/636: Performed by: SPECIALIST

## 2017-08-29 PROCEDURE — 74011250636 HC RX REV CODE- 250/636

## 2017-08-29 PROCEDURE — 77030008603 HC TRCR ENDOSC EPATH J&J -C: Performed by: SPECIALIST

## 2017-08-29 PROCEDURE — 77030034154 HC SHR COAG HARM ACE J&J -F: Performed by: SPECIALIST

## 2017-08-29 PROCEDURE — 0BQR4ZZ REPAIR RIGHT DIAPHRAGM, PERCUTANEOUS ENDOSCOPIC APPROACH: ICD-10-PCS | Performed by: SPECIALIST

## 2017-08-29 PROCEDURE — 76210000000 HC OR PH I REC 2 TO 2.5 HR: Performed by: SPECIALIST

## 2017-08-29 PROCEDURE — 77010033678 HC OXYGEN DAILY

## 2017-08-29 PROCEDURE — 74011250636 HC RX REV CODE- 250/636: Performed by: ANESTHESIOLOGY

## 2017-08-29 PROCEDURE — 76060000034 HC ANESTHESIA 1.5 TO 2 HR: Performed by: SPECIALIST

## 2017-08-29 PROCEDURE — 77030012407 HC DRN WND BARD -B: Performed by: SPECIALIST

## 2017-08-29 PROCEDURE — 65270000029 HC RM PRIVATE

## 2017-08-29 PROCEDURE — 88313 SPECIAL STAINS GROUP 2: CPT | Performed by: SPECIALIST

## 2017-08-29 PROCEDURE — 0FB24ZX EXCISION OF LEFT LOBE LIVER, PERCUTANEOUS ENDOSCOPIC APPROACH, DIAGNOSTIC: ICD-10-PCS | Performed by: SPECIALIST

## 2017-08-29 PROCEDURE — 77030002912 HC SUT ETHBND J&J -A: Performed by: SPECIALIST

## 2017-08-29 PROCEDURE — 88307 TISSUE EXAM BY PATHOLOGIST: CPT | Performed by: SPECIALIST

## 2017-08-29 PROCEDURE — 74011000250 HC RX REV CODE- 250

## 2017-08-29 PROCEDURE — 77030008477 HC STYL SATN SLP COVD -A: Performed by: NURSE ANESTHETIST, CERTIFIED REGISTERED

## 2017-08-29 PROCEDURE — 0DB64Z3 EXCISION OF STOMACH, PERCUTANEOUS ENDOSCOPIC APPROACH, VERTICAL: ICD-10-PCS | Performed by: SPECIALIST

## 2017-08-29 PROCEDURE — 77030022585 HC SEAL FBRN EVICEL J&J -F: Performed by: SPECIALIST

## 2017-08-29 PROCEDURE — 77030018836 HC SOL IRR NACL ICUM -A: Performed by: SPECIALIST

## 2017-08-29 PROCEDURE — 77030020255 HC SOL INJ LR 1000ML BG: Performed by: SPECIALIST

## 2017-08-29 PROCEDURE — 77030003580 HC NDL INSUF VERES J&J -B: Performed by: SPECIALIST

## 2017-08-29 PROCEDURE — 77030034029 HC SLV GASTRCTMY CAL SYS DISP BOEH -C: Performed by: SPECIALIST

## 2017-08-29 PROCEDURE — 77030008683 HC TU ET CUF COVD -A: Performed by: NURSE ANESTHETIST, CERTIFIED REGISTERED

## 2017-08-29 PROCEDURE — 74011250637 HC RX REV CODE- 250/637: Performed by: SPECIALIST

## 2017-08-29 PROCEDURE — 77030027876 HC STPLR ENDOSC FLX PWR J&J -G1: Performed by: SPECIALIST

## 2017-08-29 PROCEDURE — 77030009426 HC FCPS BIOP ENDOSC BSC -B: Performed by: SPECIALIST

## 2017-08-29 PROCEDURE — 0BQS4ZZ REPAIR LEFT DIAPHRAGM, PERCUTANEOUS ENDOSCOPIC APPROACH: ICD-10-PCS | Performed by: SPECIALIST

## 2017-08-29 PROCEDURE — 77030006643: Performed by: NURSE ANESTHETIST, CERTIFIED REGISTERED

## 2017-08-29 PROCEDURE — 77030036598 HC CARTDRG STPL RELD ECHELON FLX J&J -D: Performed by: SPECIALIST

## 2017-08-29 PROCEDURE — 77030020782 HC GWN BAIR PAWS FLX 3M -B: Performed by: SPECIALIST

## 2017-08-29 PROCEDURE — 77030032490 HC SLV COMPR SCD KNE COVD -B: Performed by: SPECIALIST

## 2017-08-29 PROCEDURE — 88305 TISSUE EXAM BY PATHOLOGIST: CPT | Performed by: SPECIALIST

## 2017-08-29 PROCEDURE — 74011000250 HC RX REV CODE- 250: Performed by: SPECIALIST

## 2017-08-29 PROCEDURE — 77030033200 HC PRT CLSR CRTR THOMP COOP -C: Performed by: SPECIALIST

## 2017-08-29 PROCEDURE — 77030010515 HC APPL ENDOCLP LIG J&J -B: Performed by: SPECIALIST

## 2017-08-29 RX ORDER — NALOXONE HYDROCHLORIDE 0.4 MG/ML
0.1 INJECTION, SOLUTION INTRAMUSCULAR; INTRAVENOUS; SUBCUTANEOUS
Status: DISCONTINUED | OUTPATIENT
Start: 2017-08-29 | End: 2017-08-29 | Stop reason: HOSPADM

## 2017-08-29 RX ORDER — ONDANSETRON 2 MG/ML
4 INJECTION INTRAMUSCULAR; INTRAVENOUS ONCE
Status: DISCONTINUED | OUTPATIENT
Start: 2017-08-29 | End: 2017-08-29 | Stop reason: HOSPADM

## 2017-08-29 RX ORDER — SODIUM CHLORIDE 0.9 % (FLUSH) 0.9 %
5-10 SYRINGE (ML) INJECTION AS NEEDED
Status: DISCONTINUED | OUTPATIENT
Start: 2017-08-29 | End: 2017-08-29 | Stop reason: HOSPADM

## 2017-08-29 RX ORDER — ONDANSETRON 2 MG/ML
INJECTION INTRAMUSCULAR; INTRAVENOUS AS NEEDED
Status: DISCONTINUED | OUTPATIENT
Start: 2017-08-29 | End: 2017-08-29 | Stop reason: HOSPADM

## 2017-08-29 RX ORDER — MIDAZOLAM HYDROCHLORIDE 1 MG/ML
INJECTION, SOLUTION INTRAMUSCULAR; INTRAVENOUS AS NEEDED
Status: DISCONTINUED | OUTPATIENT
Start: 2017-08-29 | End: 2017-08-29 | Stop reason: HOSPADM

## 2017-08-29 RX ORDER — ACETAMINOPHEN 10 MG/ML
1000 INJECTION, SOLUTION INTRAVENOUS ONCE
Status: COMPLETED | OUTPATIENT
Start: 2017-08-29 | End: 2017-08-29

## 2017-08-29 RX ORDER — CEFAZOLIN SODIUM 2 G/50ML
2 SOLUTION INTRAVENOUS EVERY 8 HOURS
Status: COMPLETED | OUTPATIENT
Start: 2017-08-29 | End: 2017-08-30

## 2017-08-29 RX ORDER — FENTANYL CITRATE 50 UG/ML
INJECTION, SOLUTION INTRAMUSCULAR; INTRAVENOUS AS NEEDED
Status: DISCONTINUED | OUTPATIENT
Start: 2017-08-29 | End: 2017-08-29 | Stop reason: HOSPADM

## 2017-08-29 RX ORDER — METOCLOPRAMIDE HYDROCHLORIDE 5 MG/ML
INJECTION INTRAMUSCULAR; INTRAVENOUS AS NEEDED
Status: DISCONTINUED | OUTPATIENT
Start: 2017-08-29 | End: 2017-08-29 | Stop reason: HOSPADM

## 2017-08-29 RX ORDER — HYDROMORPHONE HYDROCHLORIDE 2 MG/ML
INJECTION, SOLUTION INTRAMUSCULAR; INTRAVENOUS; SUBCUTANEOUS AS NEEDED
Status: DISCONTINUED | OUTPATIENT
Start: 2017-08-29 | End: 2017-08-29 | Stop reason: HOSPADM

## 2017-08-29 RX ORDER — MAGNESIUM SULFATE 100 %
4 CRYSTALS MISCELLANEOUS AS NEEDED
Status: DISCONTINUED | OUTPATIENT
Start: 2017-08-29 | End: 2017-08-29 | Stop reason: HOSPADM

## 2017-08-29 RX ORDER — SODIUM CHLORIDE, SODIUM LACTATE, POTASSIUM CHLORIDE, CALCIUM CHLORIDE 600; 310; 30; 20 MG/100ML; MG/100ML; MG/100ML; MG/100ML
125 INJECTION, SOLUTION INTRAVENOUS CONTINUOUS
Status: DISCONTINUED | OUTPATIENT
Start: 2017-08-29 | End: 2017-08-29

## 2017-08-29 RX ORDER — NEOSTIGMINE METHYLSULFATE 5 MG/5 ML
SYRINGE (ML) INTRAVENOUS AS NEEDED
Status: DISCONTINUED | OUTPATIENT
Start: 2017-08-29 | End: 2017-08-29 | Stop reason: HOSPADM

## 2017-08-29 RX ORDER — HYDROMORPHONE HYDROCHLORIDE 1 MG/ML
1 INJECTION, SOLUTION INTRAMUSCULAR; INTRAVENOUS; SUBCUTANEOUS
Status: DISCONTINUED | OUTPATIENT
Start: 2017-08-29 | End: 2017-08-30

## 2017-08-29 RX ORDER — NYSTATIN 100000 [USP'U]/ML
500000 SUSPENSION ORAL
Status: COMPLETED | OUTPATIENT
Start: 2017-08-29 | End: 2017-08-29

## 2017-08-29 RX ORDER — BUPIVACAINE HYDROCHLORIDE 2.5 MG/ML
INJECTION, SOLUTION EPIDURAL; INFILTRATION; INTRACAUDAL AS NEEDED
Status: DISCONTINUED | OUTPATIENT
Start: 2017-08-29 | End: 2017-08-29 | Stop reason: HOSPADM

## 2017-08-29 RX ORDER — OXYCODONE AND ACETAMINOPHEN 5; 325 MG/1; MG/1
1 TABLET ORAL AS NEEDED
Status: DISCONTINUED | OUTPATIENT
Start: 2017-08-29 | End: 2017-08-29 | Stop reason: HOSPADM

## 2017-08-29 RX ORDER — NYSTATIN 100000 [USP'U]/ML
500000 SUSPENSION ORAL
Status: DISCONTINUED | OUTPATIENT
Start: 2017-08-29 | End: 2017-08-29

## 2017-08-29 RX ORDER — CEFAZOLIN SODIUM 2 G/50ML
2 SOLUTION INTRAVENOUS ONCE
Status: COMPLETED | OUTPATIENT
Start: 2017-08-29 | End: 2017-08-29

## 2017-08-29 RX ORDER — GLYCOPYRROLATE 0.2 MG/ML
INJECTION INTRAMUSCULAR; INTRAVENOUS AS NEEDED
Status: DISCONTINUED | OUTPATIENT
Start: 2017-08-29 | End: 2017-08-29 | Stop reason: HOSPADM

## 2017-08-29 RX ORDER — DEXTROSE 50 % IN WATER (D50W) INTRAVENOUS SYRINGE
25-50 AS NEEDED
Status: DISCONTINUED | OUTPATIENT
Start: 2017-08-29 | End: 2017-08-29 | Stop reason: HOSPADM

## 2017-08-29 RX ORDER — PROPOFOL 10 MG/ML
INJECTION, EMULSION INTRAVENOUS AS NEEDED
Status: DISCONTINUED | OUTPATIENT
Start: 2017-08-29 | End: 2017-08-29 | Stop reason: HOSPADM

## 2017-08-29 RX ORDER — ONDANSETRON 2 MG/ML
4 INJECTION INTRAMUSCULAR; INTRAVENOUS
Status: DISCONTINUED | OUTPATIENT
Start: 2017-08-29 | End: 2017-08-30 | Stop reason: HOSPADM

## 2017-08-29 RX ORDER — HYDROMORPHONE HYDROCHLORIDE 1 MG/ML
0.5 INJECTION, SOLUTION INTRAMUSCULAR; INTRAVENOUS; SUBCUTANEOUS
Status: DISCONTINUED | OUTPATIENT
Start: 2017-08-29 | End: 2017-08-30

## 2017-08-29 RX ORDER — FENTANYL CITRATE 50 UG/ML
25 INJECTION, SOLUTION INTRAMUSCULAR; INTRAVENOUS
Status: ACTIVE | OUTPATIENT
Start: 2017-08-29 | End: 2017-08-29

## 2017-08-29 RX ORDER — SODIUM CHLORIDE, SODIUM LACTATE, POTASSIUM CHLORIDE, CALCIUM CHLORIDE 600; 310; 30; 20 MG/100ML; MG/100ML; MG/100ML; MG/100ML
50 INJECTION, SOLUTION INTRAVENOUS CONTINUOUS
Status: DISCONTINUED | OUTPATIENT
Start: 2017-08-29 | End: 2017-08-29 | Stop reason: HOSPADM

## 2017-08-29 RX ORDER — HYDROMORPHONE HYDROCHLORIDE 2 MG/ML
0.5 INJECTION, SOLUTION INTRAMUSCULAR; INTRAVENOUS; SUBCUTANEOUS
Status: DISCONTINUED | OUTPATIENT
Start: 2017-08-29 | End: 2017-08-29 | Stop reason: HOSPADM

## 2017-08-29 RX ORDER — SODIUM CHLORIDE, SODIUM LACTATE, POTASSIUM CHLORIDE, CALCIUM CHLORIDE 600; 310; 30; 20 MG/100ML; MG/100ML; MG/100ML; MG/100ML
150 INJECTION, SOLUTION INTRAVENOUS CONTINUOUS
Status: DISCONTINUED | OUTPATIENT
Start: 2017-08-29 | End: 2017-08-30 | Stop reason: HOSPADM

## 2017-08-29 RX ORDER — LIDOCAINE HYDROCHLORIDE 20 MG/ML
INJECTION, SOLUTION EPIDURAL; INFILTRATION; INTRACAUDAL; PERINEURAL AS NEEDED
Status: DISCONTINUED | OUTPATIENT
Start: 2017-08-29 | End: 2017-08-29 | Stop reason: HOSPADM

## 2017-08-29 RX ORDER — ENOXAPARIN SODIUM 100 MG/ML
40 INJECTION SUBCUTANEOUS ONCE
Status: COMPLETED | OUTPATIENT
Start: 2017-08-29 | End: 2017-08-29

## 2017-08-29 RX ORDER — DIPHENHYDRAMINE HYDROCHLORIDE 50 MG/ML
25 INJECTION, SOLUTION INTRAMUSCULAR; INTRAVENOUS
Status: DISCONTINUED | OUTPATIENT
Start: 2017-08-29 | End: 2017-08-30 | Stop reason: HOSPADM

## 2017-08-29 RX ORDER — ROCURONIUM BROMIDE 10 MG/ML
INJECTION, SOLUTION INTRAVENOUS AS NEEDED
Status: DISCONTINUED | OUTPATIENT
Start: 2017-08-29 | End: 2017-08-29 | Stop reason: HOSPADM

## 2017-08-29 RX ORDER — FAMOTIDINE 10 MG/ML
20 INJECTION INTRAVENOUS ONCE
Status: COMPLETED | OUTPATIENT
Start: 2017-08-29 | End: 2017-08-29

## 2017-08-29 RX ORDER — ACETAMINOPHEN 10 MG/ML
1000 INJECTION, SOLUTION INTRAVENOUS EVERY 6 HOURS
Status: COMPLETED | OUTPATIENT
Start: 2017-08-29 | End: 2017-08-30

## 2017-08-29 RX ORDER — INSULIN LISPRO 100 [IU]/ML
INJECTION, SOLUTION INTRAVENOUS; SUBCUTANEOUS ONCE
Status: DISCONTINUED | OUTPATIENT
Start: 2017-08-29 | End: 2017-08-29 | Stop reason: HOSPADM

## 2017-08-29 RX ORDER — ENOXAPARIN SODIUM 100 MG/ML
40 INJECTION SUBCUTANEOUS EVERY 12 HOURS
Status: DISCONTINUED | OUTPATIENT
Start: 2017-08-29 | End: 2017-08-30 | Stop reason: HOSPADM

## 2017-08-29 RX ADMIN — MIDAZOLAM HYDROCHLORIDE 2 MG: 1 INJECTION, SOLUTION INTRAMUSCULAR; INTRAVENOUS at 10:39

## 2017-08-29 RX ADMIN — GLYCOPYRROLATE 0.2 MG: 0.2 INJECTION INTRAMUSCULAR; INTRAVENOUS at 10:39

## 2017-08-29 RX ADMIN — METOCLOPRAMIDE HYDROCHLORIDE 10 MG: 5 INJECTION INTRAMUSCULAR; INTRAVENOUS at 12:00

## 2017-08-29 RX ADMIN — ACETAMINOPHEN 1000 MG: 10 INJECTION, SOLUTION INTRAVENOUS at 23:10

## 2017-08-29 RX ADMIN — FENTANYL CITRATE 100 MCG: 50 INJECTION, SOLUTION INTRAMUSCULAR; INTRAVENOUS at 11:09

## 2017-08-29 RX ADMIN — CEFAZOLIN SODIUM 2 G: 2 SOLUTION INTRAVENOUS at 20:09

## 2017-08-29 RX ADMIN — HYDROMORPHONE HYDROCHLORIDE 0.5 MG: 1 INJECTION, SOLUTION INTRAMUSCULAR; INTRAVENOUS; SUBCUTANEOUS at 20:19

## 2017-08-29 RX ADMIN — HYDROMORPHONE HYDROCHLORIDE 1 MG: 1 INJECTION, SOLUTION INTRAMUSCULAR; INTRAVENOUS; SUBCUTANEOUS at 16:26

## 2017-08-29 RX ADMIN — ENOXAPARIN SODIUM 40 MG: 40 INJECTION SUBCUTANEOUS at 20:20

## 2017-08-29 RX ADMIN — ONDANSETRON 4 MG: 2 INJECTION INTRAMUSCULAR; INTRAVENOUS at 16:26

## 2017-08-29 RX ADMIN — ACETAMINOPHEN 1000 MG: 10 INJECTION, SOLUTION INTRAVENOUS at 19:03

## 2017-08-29 RX ADMIN — FAMOTIDINE 20 MG: 10 INJECTION, SOLUTION INTRAVENOUS at 09:12

## 2017-08-29 RX ADMIN — HYDROMORPHONE HYDROCHLORIDE 0.5 MG: 2 INJECTION INTRAMUSCULAR; INTRAVENOUS; SUBCUTANEOUS at 12:53

## 2017-08-29 RX ADMIN — ROCURONIUM BROMIDE 50 MG: 10 INJECTION, SOLUTION INTRAVENOUS at 10:45

## 2017-08-29 RX ADMIN — ACETAMINOPHEN 1000 MG: 10 INJECTION, SOLUTION INTRAVENOUS at 10:39

## 2017-08-29 RX ADMIN — NYSTATIN 500000 UNITS: 100000 SUSPENSION ORAL at 09:12

## 2017-08-29 RX ADMIN — GLYCOPYRROLATE 0.6 MG: 0.2 INJECTION INTRAMUSCULAR; INTRAVENOUS at 12:01

## 2017-08-29 RX ADMIN — Medication 3 MG: at 12:01

## 2017-08-29 RX ADMIN — ONDANSETRON 4 MG: 2 INJECTION INTRAMUSCULAR; INTRAVENOUS at 10:39

## 2017-08-29 RX ADMIN — FENTANYL CITRATE 100 MCG: 50 INJECTION, SOLUTION INTRAMUSCULAR; INTRAVENOUS at 10:41

## 2017-08-29 RX ADMIN — PROPOFOL 200 MG: 10 INJECTION, EMULSION INTRAVENOUS at 10:45

## 2017-08-29 RX ADMIN — SODIUM CHLORIDE, SODIUM LACTATE, POTASSIUM CHLORIDE, AND CALCIUM CHLORIDE: 600; 310; 30; 20 INJECTION, SOLUTION INTRAVENOUS at 12:07

## 2017-08-29 RX ADMIN — HYDROMORPHONE HYDROCHLORIDE 1 MG: 2 INJECTION, SOLUTION INTRAMUSCULAR; INTRAVENOUS; SUBCUTANEOUS at 11:10

## 2017-08-29 RX ADMIN — ENOXAPARIN SODIUM 40 MG: 40 INJECTION SUBCUTANEOUS at 09:12

## 2017-08-29 RX ADMIN — HYDROMORPHONE HYDROCHLORIDE 0.5 MG: 2 INJECTION INTRAMUSCULAR; INTRAVENOUS; SUBCUTANEOUS at 12:42

## 2017-08-29 RX ADMIN — SODIUM CHLORIDE, SODIUM LACTATE, POTASSIUM CHLORIDE, AND CALCIUM CHLORIDE 125 ML/HR: 600; 310; 30; 20 INJECTION, SOLUTION INTRAVENOUS at 09:12

## 2017-08-29 RX ADMIN — LIDOCAINE HYDROCHLORIDE 80 MG: 20 INJECTION, SOLUTION EPIDURAL; INFILTRATION; INTRACAUDAL; PERINEURAL at 10:45

## 2017-08-29 RX ADMIN — CEFAZOLIN SODIUM 2 G: 2 SOLUTION INTRAVENOUS at 10:39

## 2017-08-29 RX ADMIN — SODIUM CHLORIDE, SODIUM LACTATE, POTASSIUM CHLORIDE, AND CALCIUM CHLORIDE 50 ML/HR: 600; 310; 30; 20 INJECTION, SOLUTION INTRAVENOUS at 14:03

## 2017-08-29 RX ADMIN — SODIUM CHLORIDE, SODIUM LACTATE, POTASSIUM CHLORIDE, AND CALCIUM CHLORIDE: 600; 310; 30; 20 INJECTION, SOLUTION INTRAVENOUS at 11:10

## 2017-08-29 NOTE — PROGRESS NOTES
Pt admitted for an elective surgical procedure. Pt is independent. Please encourage ambulation. No plan of care needs identified. Anticipate pt will be medically stable for discharge within the next 24-48 hours. CM available to assist as needed. Discharge Reassessment Plan:  Low Risk    RRAT Score:  1 - 12     Low Risk Care Transition Interventions:  1. Discharge transition plan:  Physician follow up   2. Involved patient/caregiver in assessment, planning, education and implement of intervention. 3. CM daily patient care huddles/interdisciplinary rounds were completed. 4. PCP/Specialist appointment within 5 days made prior to discharge. Date/Time  5. Facilitated transportation and logistics for follow-up appointments. 6. Handoff to [address] [place] [address] Nurse Navigator or PCP practice. Care Management Interventions  PCP Verified by CM: Yes  Mode of Transport at Discharge:  Other (see comment) (Spouse)  Transition of Care Consult (CM Consult): Discharge Planning  Health Maintenance Reviewed: Yes  Current Support Network: Lives with Spouse  Confirm Follow Up Transport: Self  Discharge Location  Discharge Placement: Home

## 2017-08-29 NOTE — IP AVS SNAPSHOT
[address] [address] 
[phone] Patient: Ashlee Hernandez MRN: [id number] You are allergic to the following No active allergies Recent Documentation Height Weight BMI OB Status Smoking Status 1.626 m 114.3 kg 43.26 kg/m2 Hysterectomy Never Smoker Emergency Contacts Name Discharge Info Relation Home Work Mobile Luis Padilla DISCHARGE CAREGIVER [3] Spouse [3] [phone] About your hospitalization You were admitted on:  August 29, 2017 You last received care in the:  [address] [place] [address] You were discharged on:  August 30, 2017 Unit phone number:  [phone] Why you were hospitalized Your primary diagnosis was:  Not on File Your diagnoses also included: Morbid Obesity With Bmi Of 40.0-44.9, Adult (Hcc) Providers Seen During Your Hospitalizations Provider Role Specialty Primary office phone Armando Stern MD Attending Provider General Surgery [phone] Your Primary Care Physician (PCP) Primary Care Physician Office Phone Office Fax DANIELLE OLSON ** None ** ** None ** Follow-up Information Follow up With Details Comments Contact Info Armando Stern MD   [address] [facility] 
[phone] Nanette Burroughs MD     
  
Your Appointments Friday September 29, 2017  9:00 AM EDT Office Visit with MD Sol Roberts C[facility] Janae Pro)  
 [address] [facility]  
[phone] Friday September 29, 2017  9:30 AM EDT Office Visit with MERI CARPIO VISIT2 Sol [facility] Janae Pro)  
 [address] [facility]  
[phone] Current Discharge Medication List  
  
Notice You have not been prescribed any medications. Discharge Instructions [facility] Gary Ramos M.D., F.A.C.S. 
[address] [place] [address] Drive. [address] Benitez, [address] [facility] Office: [phone]    Fax:  [phone] Discharge Instruction for Gastric Bypass / Sleeve Gastrectomy Patients Diet: 
? Continue with the liquid diet until you are seen in the office. Make sure you sip fluids all day. Aim for  Gms of protein every day. Activity: 
? Walking is encouraged. Rest when you are tired. ? You may shower. ? You may climb stairs. Take your time. ? No lifting more than 10-15 lbs. ? If you feel discomfort during an activity, rest. 
? Do not drive for 1 week. Wound Care: ? Clean incisions with soap and water when in the shower. Pat dry. ? Leave steri-strips on until they fall off. ? A small amount of drainage may be present from the incisions. Contact the office if you notice an increase in drainage, an odor, increased redness, or fever > 100.5. Medications: 
? You will receive a prescription for pain medication at the time of discharge. ? For upset stomach you may take over the counter medications such as Maalox, Mylanta, Pepcid, Zantac, or Tagamet. ? You may take Tylenol 
? Non-aspirin based arthritis medications may be resumed after the first month. ? Take a childrens or adult chewable multivitamin. ? Milk of Magnesia will help with constipation. ? It is fine to take your usual home medications. Blood pressure medications should be continued after surgery. Diabetic medications can frequently be reduced very quickly after surgery. Diabetics should continue to monitor blood sugars frequently after surgery and contact the prescribing physician for any questions. Follow-Up Appointment: 
?  If you do not already have a follow-up appointment scheduled, contact the office in the next few days to obtain one. It is usually scheduled for 10-14 days after you surgery date. Office phone number:  [phone]. Patient armband removed and shredded DISCHARGE SUMMARY from Nurse The following personal items are in your possession at time of discharge: 
 
Dental Appliances: None Visual Aid: Glasses Home Medications: None Jewelry: None Clothing: Undergarments, Dress, Footwear (with spouse) Other Valuables: Eyeglasses, Other (comment) (ID - wit spouse) PATIENT INSTRUCTIONS: 
 
 
F-face looks uneven A-arms unable to move or move unevenly S-speech slurred or non-existent T-time-call 911 as soon as signs and symptoms begin-DO NOT go Back to bed or wait to see if you get better-TIME IS BRAIN. Warning Signs of HEART ATTACK Call 911 if you have these symptoms: 
? Chest discomfort. Most heart attacks involve discomfort in the center of the chest that lasts more than a few minutes, or that goes away and comes back. It can feel like uncomfortable pressure, squeezing, fullness, or pain. ? Discomfort in other areas of the upper body. Symptoms can include pain or discomfort in one or both arms, the back, neck, jaw, or stomach. ? Shortness of breath with or without chest discomfort. ? Other signs may include breaking out in a cold sweat, nausea, or lightheadedness. Don't wait more than five minutes to call [address]! Fast action can save your life.  Calling 911 is almost always the fastest way to get lifesaving treatment. Emergency Medical Services staff can begin treatment when they arrive  up to an hour sooner than if someone gets to the hospital by car. The discharge information has been reviewed with the {PATIENT PARENT GUARDIAN:64978}. The {PATIENT PARENT GUARDIAN:50391} verbalized understanding. Discharge medications reviewed with the {Dishcarge meds reviewed XHYB:07514} and appropriate educational materials and side effects teaching were provided. REV  09/2010 Discharge Orders None "[employer]" Announcement We are excited to announce that we are making your provider's discharge notes available to you in "[employer]". You will see these notes when they are completed and signed by the physician that discharged you from your recent hospital stay. If you have any questions or concerns about any information you see in "[employer]", please call the Health Information Department where you were seen or reach out to your Primary Care Provider for more information about your plan of care. Introducing [facility]! Earle Rowe introduces "[employer] [employer]" patient portal. Now you can access parts of your medical record, email your doctor's office, and request medication refills online. 1. In your internet browser, go to [email or web address] 2. Click on the First Time User? Click Here link in the Sign In box. You will see the New Member Sign Up page. 3. Enter your "[employer]" Access Code exactly as it appears below. You will not need to use this code after youve completed the sign-up process. If you do not sign up before the expiration date, you must request a new code. · "[employer]" Access Code: [email or web address] Expires: 10/29/2017  9:58 AM 
 
4. Enter the last four digits of your Social Security Number (xxxx) and Date of Birth (mm/dd/yyyy) as indicated and click Submit. You will be taken to the next sign-up page. 5. Create a MindBites ID. This will be your MindBites login ID and cannot be changed, so think of one that is secure and easy to remember. 6. Create a MindBites password. You can change your password at any time. 7. Enter your Password Reset Question and Answer. This can be used at a later time if you forget your password. 8. Enter your e-mail address. You will receive e-mail notification when new information is available in [address] 9. Click Sign Up. You can now view and download portions of your medical record. 10. Click the Download Summary menu link to download a portable copy of your medical information. If you have questions, please visit the Frequently Asked Questions section of the MindBites website. Remember, MindBites is NOT to be used for urgent needs. For medical emergencies, dial 911. Now available from your iPhone and Android! General Information Please provide this summary of care documentation to your next provider. Patient Signature:  ____________________________________________________________ Date:  ____________________________________________________________  
  
Iman Durant Provider Signature:  ____________________________________________________________ Date:  ____________________________________________________________

## 2017-08-29 NOTE — ANESTHESIA POSTPROCEDURE EVALUATION
Post-Anesthesia Evaluation and Assessment    Cardiovascular Function/Vital Signs  Visit Vitals    /80    Pulse 65    Temp 36.8 °C (98.2 °F)    Resp 14    Ht 5' 4\" (1.626 m)    Wt 108.6 kg (239 lb 8 oz)    SpO2 98%    BMI 41.11 kg/m2       Patient is status post Procedure(s):  LAPAROSCOPIC GASTRIC SLEEVE, DIAPHRAGMIC HERNIA REPAIR,WEDGE LIVER BIOPSY, INTRAOPERATIVE ENDOSCOPY AND BIOPSY. Nausea/Vomiting: Controlled. Postoperative hydration reviewed and adequate. Pain:  Pain Scale 1: FLACC (08/29/17 1329)  Pain Intensity 1: 0 (08/29/17 1329)   Managed. Neurological Status:   Neuro (WDL): Within Defined Limits (08/29/17 1217)   At baseline. Mental Status and Level of Consciousness: Arousable. Pulmonary Status:   O2 Device: Nasal cannula (08/29/17 1350)   Adequate oxygenation and airway patent. Complications related to anesthesia: None    Post-anesthesia assessment completed. No concerns. Patient has met all discharge requirements.     Signed By: Gomez Ruff CRNA    August 29, 2017

## 2017-08-29 NOTE — PERIOP NOTES
TRANSFER - OUT REPORT:    Verbal report given to DEV De Los Santos (name) on [facility]  being transferred to [facility] (unit) for routine progression of care       Report consisted of patients Situation, Background, Assessment and   Recommendations(SBAR). Information from the following report(s) SBAR, OR Summary, Procedure Summary, Intake/Output, MAR and Accordion was reviewed with the receiving nurse. Lines:   Peripheral IV 08/29/17 Left Wrist (Active)   Site Assessment Clean, dry, & intact 8/29/2017  2:00 PM   Phlebitis Assessment 0 8/29/2017  2:00 PM   Infiltration Assessment 0 8/29/2017  2:00 PM   Dressing Status Clean, dry, & intact 8/29/2017  2:00 PM   Dressing Type Transparent 8/29/2017  2:00 PM   Hub Color/Line Status Green; Infusing 8/29/2017  2:00 PM        Opportunity for questions and clarification was provided.       Patient transported with:   O2 @ 3 liters  Registered Nurse        \

## 2017-08-29 NOTE — ANESTHESIA PREPROCEDURE EVALUATION
Anesthetic History   No history of anesthetic complications            Review of Systems / Medical History  Patient summary reviewed, nursing notes reviewed and pertinent labs reviewed    Pulmonary  Within defined limits                 Neuro/Psych   Within defined limits           Cardiovascular  Within defined limits                     GI/Hepatic/Renal     GERD           Endo/Other  Within defined limits           Other Findings              Physical Exam    Airway  Mallampati: II    Neck ROM: normal range of motion   Mouth opening: Normal     Cardiovascular  Regular rate and rhythm,  S1 and S2 normal,  no murmur, click, rub, or gallop             Dental  No notable dental hx       Pulmonary  Breath sounds clear to auscultation               Abdominal  Abdominal exam normal       Other Findings            Anesthetic Plan    ASA: 2  Anesthesia type: general          Induction: Intravenous  Anesthetic plan and risks discussed with: Family and Patient

## 2017-08-29 NOTE — OP NOTES
OPERATIVE REPORT         Patient:Maira Padilla   : 1955  Medical Record [id number]    Pre-operative Diagnosis:  MORBID OBESITY,GERD,BMI 40, FATTY LIVER  Post-operative Diagnosis: MORBID OBESITY,GERD,BMI 40, FATTY LIVER  Procedure: Procedure(s):  LAPAROSCOPIC GASTRIC SLEEVE  [address] HERNIA REPAIR  WEDGE LIVER BIOPSY  INTRAOPERATIVE ENDOSCOPY AND BIOPSY  [facility]: [facility]  Surgeon: Wu Copeland MD  Assistant:  Omer Morris [facility]    Anesthesia: General       Specimens: 1. Gastric Sleeve Resection                       2. Liver Wedge Biopsy    EBL: less than 20 cc  Additional Findings: none             STATEMENT OF MEDICAL NECESSITY: The patient is a 57y.o.-year-old female who has had a history of obesity. she has failed conservative weight loss measures,   such began to consider weight loss surgical options. she chose the   sleeve gastrectomy as a means of surgical weight control. she has undergone   nutritional and psychological teaching at this time period and does wish to proceed   with sleeve gastrectomy. OPERATIVE PROCEDURE: The patient was brought to the operating room, placed   on the table in supine position at which time general  anesthesia was administered   without any difficulty. The abdomen was then prepped and draped in the   usual sterile fashion. Using a 15 blade, a 1 cm incision was made just to the   left of the umbilicus. The veress needle approach was used to gain access to   the peritoneal cavity which was then insufflated. The Visi-Port was then placed   at that site,then 4 additional trocars were placed in the usual U-shaped   configuration with a subxiphoid incision being made to accommodate the   [facility] retractor. On entering the abdomen, the patient was noted to have a   moderate fatty liver with possible evidence of early steatohepatitis. I elevated the liver and noted the patient had a diaphragmatic hernia present.  I began the operation by choosing an area 2-3 cm proximal to the pylorus and within the gastroepiploic vessels I began to divide off these vessels individually. I moved cephalad toward short gastric vessels, which were very difficult to take down due to the proximity to the splenic hilum. I was able to do so, clearing the entire left crural area. I then placed a Visigi tube, impacting at the distal antrum. I then began the resection with the powered Novelty   stapler using the green loads for the first firing tangential along the   antral region. The second and subsequent firings were used with blue  reloads  reaching just past the incisura region. The remainder of the 5 vertical   firings completed the resection at the left crural region. I then tested   the pouch via the Visigi tube using dilute methylene blue,it was noted to be completely   Watertight. I then left the operative field and proceeded to the the head of the bed and performed an intraoperative EGD. The scope was passed successfully into the gastric sleeve to the level of the pylorus. Biopsies were taken of this region and submitted to test both for H Pylori and for pathologic diagnosis. There was no bleeding noted and no leak appreciated with air insufflation. I then returned to the surgical field. I then obtained hemostasis along the staple line using   Hemoclips and sutures where needed. I then used 3 separate 2-0 Ethibond sutures to secure the lateral aspect of the newly created sleeve stomach to the resected edge of the gastrocolic omentum in an effort to maintain the continuity of the sleeve and prevent twisting. I then turned my attention to the diaphragmatic repair. I then dissected out the diaphragmatic hernia and closed it using two separate 2-0 Ethibond sutures against the esophageal wall, taking care not to encroach upon the esophagus.  I then used Eviseal along the entire staple line to obtain hemostasis and then place Surgicel Snow over the staple line also. With all this having been completed, I then removed the liver retractor. I performed a liver wedge biopsy of the left lobe of the liver due to its abnormality and submitted to pathology for permanent section. I then placed a MAGGIE drain in the left upper quadrant region along the staple line. I removed the specimen from the operative field via the LLQ incision. I closed the left lower quadrant trocar site using a transabdominal #0 PDS suture along the fascia, and all skin incisions were then closed using 4-0 subcuticular Monocryl. Steri-Strips and sterile dressings were applied. The patient tolerated the procedure well.        Starr Galvez M.D.

## 2017-08-29 NOTE — INTERVAL H&P NOTE
H&P Update: Marylene Blakes was seen and examined. History and physical has been reviewed. The patient has been examined.  There have been no significant clinical changes since the completion of the originally dated History and Physical.    Signed By: Sarah Echeverria MD     August 29, 2017 9:50 AM

## 2017-08-29 NOTE — NURSE NAVIGATOR
Doing well. Denies nausea. Pain = 7, sleeps when undisturbed. Tolerating ice chips. Dressings dry and intact. Adequate urinary output. MAGGIE output WNL. Encouraged to cough, deep breathe, and use spirometer every hour while awake. Encouraged to be out of bed ambulating this evening. Discussed diet and activity progression tomorrow after UGI.

## 2017-08-29 NOTE — H&P (VIEW-ONLY)
Sleeve Gastrectomy - History and Physical    Subjective: The patient is a 59 y.o. obese female with a Body mass index is 41.88 kg/(m^2). .   she presents now to review their work up to date to see if they are a candidate for surgery and whether or not to proceed with the previously requested procedure. Bariatric comorbidities continue to include:   Patient Active Problem List   Diagnosis Code    Morbid obesity with BMI of 40.0-44.9, adult ([facility] E66.01, Z68.41    Hypercholesterolemia E78.00    Chronic back pain M54.9, G89.29    Snores R06.83    Morbid obesity ([facility]) E66.01    GERD (gastroesophageal reflux disease) K21.9    Arthritic-like pain M25.50    Morbid obesity with body mass index of 40.0-49.9 ([facility]) E66.01       They have been generally well prior to this visit and have had no recent significant illnesses. The patient has had no gastrointestinal issues that would preclude them from proceeding with the surgery they have chosen. Harrison Wheat has recently tried a preoperative weight loss program  in addition to seeing a bariatric nutritionist preoperatively. We have discussed on at least one other occasion about the various types of surgical weight loss procedures and they have considered these options after our initial consultation. We have once again discussed these procedures in detail and they have now decided on a surgical procedure. They present today to discuss this and confirm that their evaluation pre operatively is acceptable to continue with surgery. The patient desires laparoscopic sleeve gastrectomy for surgical weight loss. The patients goal weight is 157 lb. (this represents a BMI of 27)    These goals are consistent with expected outcomes of their desired operation. her Medical goals are resolution of these health issues.     Patient Active Problem List    Diagnosis Date Noted    Morbid obesity with body mass index of 40.0-49.9 ([facility]     Morbid obesity ([facility]     GERD (gastroesophageal reflux disease)     Arthritic-like pain     Morbid obesity with BMI of 40.0-44.9, adult ([facility])     Hypercholesterolemia     Chronic back pain     Snores      Past Surgical History:   Procedure Laterality Date    HX LIPOSUCTION      HX TOTAL ABDOMINAL HYSTERECTOMY        Social History   Substance Use Topics    Smoking status: Never Smoker    Smokeless tobacco: Never Used    Alcohol use 0.0 oz/week     0 Standard drinks or equivalent per week      Comment: rare on Holidays      History reviewed. No pertinent family history. Current Outpatient Prescriptions   Medication Sig Dispense Refill    oxyCODONE-acetaminophen (PERCOCET) 5-325 mg per tablet Take 1 Tab by mouth every four (4) hours as needed for Pain. Max Daily Amount: 6 Tabs. 30 Tab 0    Omeprazole delayed release (PRILOSEC D/R) 20 mg tablet Take 1 Tab by mouth daily.  OTC 30 Tab 1     No Known Allergies     Review of Systems:     General - No history or complaints of unexpected fever, chills, or weight loss  Head/Neck - No history or complaints of headache, diplopia, dysphagia, hearing loss  Cardiac - No history or complaints of chest pain, palpitations, murmur, or shortness of breath  Pulmonary - No history or complaints of shortness of breath, productive cough, hemoptysis  Gastrointestinal - (+) GERD, No history or complaints of abdominal pain, obstipation/constipation, blood per rectum  Genitourinary - No history or complaints of hematuria/dysuria, stress urinary incontinence symptoms, or renal lithiasis  Musculoskeletal - (+) arthritic like pains  Hematologic - No history or complaints of bleeding disorders, blood transfusions, sickle cell anemia  Neurologic - No history or complaints of  migraine headaches, seizure activity, syncopal episodes, TIA or stroke  Integumentary - No history or complaints of rashes, abnormal nevi, skin cancer  Gynecological - post hysterectomy    Objective:     Visit Vitals    /74 (BP 1 Location: Left arm, BP Patient Position: Sitting)    Pulse 77    Resp 16    Ht 5' 4\" (1.626 m)    Wt 110.7 kg (244 lb)    SpO2 100%    BMI 41.88 kg/m2       Physical Examination: General appearance - alert, well appearing, and in no distress  Mental status - alert, oriented to person, place, and time  Eyes - pupils equal and reactive, extraocular eye movements intact  Ears - bilateral TM's and external ear canals normal  Nose - normal and patent, no erythema, discharge or polyps  Mouth - mucous membranes moist, pharynx normal without lesions  Neck - supple, no significant adenopathy  Lymphatics - no palpable lymphadenopathy, no hepatosplenomegaly  Chest - clear to auscultation, no wheezes, rales or rhonchi, symmetric air entry  Heart - normal rate, regular rhythm, normal S1, S2, no murmurs, rubs, clicks or gallops  Abdomen - soft, nontender, nondistended, no masses or organomegaly  Back exam - full range of motion, no tenderness, palpable spasm or pain on motion  Neurological - alert, oriented, normal speech, no focal findings or movement disorder noted  Musculoskeletal - no joint tenderness, deformity or swelling  Extremities - peripheral pulses normal, no pedal edema, no clubbing or cyanosis  Skin - normal coloration and turgor, no rashes, no suspicious skin lesions noted    Labs / Preoperative Evaluation:        Recent Results (from the past 1008 hour(s))   STRESS TEST CARDIAC    Collection Time: 08/10/17 11:15 AM   Result Value Ref Range    Diagnosis       Normal stress test.    Confirmed by Andreina James ([address]),  Oneal, Angella ([place]) ([id number]) on 8/10/2017   1:07:56 PM      Test indication CARDIAC CLEARANCE     Functional capacity average     ECG Interp. Before Exercise NSR     ECG Interp. During Exercise none     Overall HR response to exercise      Overall BP response to exercise      Max. Systolic  mmHg    Max. Diastolic BP 62 mmHg    Max.  Heart rate 134 BPM    [facility] treadmill score      Gutiérrez TM score result      [facility] Ex METs 9.7 METS    Protocol name MUNIR                Known cardiac condition      Attending physician SABA BARAHONA, MADHU    EKG, 12 LEAD, INITIAL    Collection Time: 08/21/17  1:03 PM   Result Value Ref Range    Ventricular Rate 59 BPM    Atrial Rate 59 BPM    P-R Interval 172 ms    QRS Duration 80 ms    Q-T Interval 456 ms    QTC Calculation (Bezet) 451 ms    Calculated P Axis 58 degrees    Calculated R Axis 50 degrees    Calculated T Axis 28 degrees    Diagnosis       Sinus bradycardia  Otherwise normal ECG  No previous ECGs available     CBC WITH AUTOMATED DIFF    Collection Time: 08/21/17  1:16 PM   Result Value Ref Range    WBC 6.0 4.6 - 13.2 K/uL    RBC 4.55 4.20 - 5.30 M/uL    HGB 12.3 12.0 - 16.0 g/dL    HCT 37.6 35.0 - 45.0 %    MCV 82.6 74.0 - 97.0 FL    MCH 27.0 24.0 - 34.0 PG    MCHC 32.7 31.0 - 37.0 g/dL    RDW 13.5 11.6 - 14.5 %    PLATELET 590 070 - 419 K/uL    MPV 10.0 9.2 - 11.8 FL    NEUTROPHILS 53 40 - 73 %    LYMPHOCYTES 36 21 - 52 %    MONOCYTES 6 3 - 10 %    EOSINOPHILS 5 0 - 5 %    BASOPHILS 0 0 - 2 %    ABS. NEUTROPHILS 3.2 1.8 - 8.0 K/UL    ABS. LYMPHOCYTES 2.2 0.9 - 3.6 K/UL    ABS. MONOCYTES 0.4 0.05 - 1.2 K/UL    ABS. EOSINOPHILS 0.3 0.0 - 0.4 K/UL    ABS. BASOPHILS 0.0 0.0 - 0.06 K/UL    DF AUTOMATED     METABOLIC PANEL, COMPREHENSIVE    Collection Time: 08/21/17  1:16 PM   Result Value Ref Range    Sodium 143 136 - 145 mmol/L    Potassium 4.3 3.5 - 5.5 mmol/L    Chloride 108 100 - 108 mmol/L    CO2 27 21 - 32 mmol/L    Anion gap 8 3.0 - 18 mmol/L    Glucose 93 74 - 99 mg/dL    BUN 10 7.0 - 18 MG/DL    Creatinine 0.77 0.6 - 1.3 MG/DL    BUN/Creatinine ratio 13 12 - 20      GFR est AA >60 >60 ml/min/1.73m2    GFR est non-AA >60 >60 ml/min/1.73m2    Calcium 8.8 8.5 - 10.1 MG/DL    Bilirubin, total 0.3 0.2 - 1.0 MG/DL    ALT (SGPT) 14 13 - 56 U/L    AST (SGOT) 11 (L) 15 - 37 U/L    Alk.  phosphatase 99 45 - 117 U/L    Protein, total 6.7 6.4 - 8.2 g/dL    Albumin 3.4 3.4 - 5.0 g/dL    Globulin 3.3 2.0 - 4.0 g/dL    A-G Ratio 1.0 0.8 - 1.7     TYPE & SCREEN    Collection Time: 08/21/17  1:16 PM   Result Value Ref Range    Crossmatch Expiration 09/01/2017     ABO/Rh(D) O POSITIVE     Antibody screen NEG        Assessment:     Morbid obesity with comorbidity    Plan:     laparoscopic sleeve gastrectomy    This is a 59 y.o. female with a BMI of Body mass index is 41.88 kg/(m^2). and the weight-related co-morbidties as noted above. Jose A Dailey meets the NIH criteria for bariatric surgery based upon the BMI of Body mass index is 41.88 kg/(m^2). and multiple weight-related co-morbidties. Jose A Dailey has elected laparoscopic sleeve gastrectomy as her intervention of choice for treatment of morbid obestiy through surgical means secondary to its safety profile, rapid return to work  and decreases in operative risks over gastric bypass. In the office today, following Maira's history and physical examination, a 40 minute discussion regarding the anatomic alterations for the laparoscopic sleeve gastrectomy was undertaken. The dietary expectations and the patient  dependent factors for success were thoroughly discussed, to include the need for interval follow-up and long-term dietary changes associated with success. The possible complications of the sleeve gastrectomy  were also discussed, to include;death, DVT/PE, staple line leak, bleeding, stricture formation, infection, nutritional deficiencies and sleeve dilation. Specific weight related outcomes for success were also discussed with an emphasis on careful and close follow-up with the first year and eating behavior modification as the baseline and cyclical hunger return. The patient expressed an understanding of the above factors, and her questions were answered in their entirety.     In addition, the patient attended a 1.5 hour power point seminar regarding obesity, surgical weight loss including, adjustable gastric band, gastric bypass, and sleeve gastrectomy. This discussion contrasted the different surgical techniques, mechanisms of actions and expected outcomes, and surgical and medical risks associated with each procedure. During this seminar, there was a long question and answer session where each questions was answered until there were no additional questions. Today, the patient had all of her questions answered and the decision was made today that the patient's preoperative evaluation is acceptable for them  to proceed with bariatric surgery  choosing the sleeve gastrectomy as her surgical option. The patient understands the plan of action    Since the patient's original consult one month ago they have been seen by their cardilogist for cardiac clearance. There has been no change to their overall medical or surgical history and they have been on no steroids in any form. Normal stress test scanned under media tab    Secondary Diagnoses:     DVT / Pulmonary Embolus Risk - The patient is at a higher risk for post operative DVT / pulmonary embolus secondary to their morbid obese status, relative sedentary lifestyle, and impending general anesthetic. We will plan to use anticoagulation therapy pre and post operative as well as  pneumatic compression devices and encourage ambulation once on the hospital nursing floor. The need for possible at home anticoagulation therapy has also been discussed and any decision on this matter will be made during post operative evaluations. The patient understands that their efforts at ambulation are of vital importance to reduce the risk of this complication thus placing significant burden on them as to the prevention of such issues. Signs and symptoms of DVT / PE have been discussed with the patient and they have been instructed to call the office if any these occur in the \"at home\" post op phase.     Hyperlipidemia - The patient understands that studies show that almost all patient will realize an improvement in their lipid profile with weight loss that occurs with these procedures. They however also understand that hyperlipidemia is a multifactorial disease particularly as it pertains to their genetic background and that there is no guarantee toward cure  of this issue. We will resume their medications immediately postoperatively as this tends to decrease any post operative cardiac events.  The patient will follow up with their family physician in the postoperative period with plans to repeat their lipid panel 2-3 month postoperative for potential adjustment or removal of these medications. GERD -The patient understands that weight loss surgery is not a guaranteed cure for reflux disease but does understand the benefits that weight loss can have on reflux disease.  They also understand that at the time of surgery the gastroesophageal junction will be evaluated for the presence of a diaphragmatic hernia.  Hernias will be corrected always with the gastric band and sleeve gastrectomy procedures, but only on a case by case basis with the gastric bypass if it prevents our ability to perform the operation at hand, or if I feel that they would benefit long term with correction of this issue.  The patient also understands that neither weight loss surgery nor repair of a diaphragmatic hernia repair guarantees the complete cessation of the disease. They also understand there is a possibility of recurrence with a simple crural repair as is performed with these procedures. They understand they may have to continue their medications in the postoperative period. They have a good understanding that the gastric bypass procedure is better suited to total resolution of this issue and that neither the Lap Band nor sleeve gastrectomy is considered a curative procedure as it pertains to this diagnosis.     Weight Related Arthritis -The patient understands the benefits that weight loss surgery can have on their arthritis but also understands that weight loss is not a guaranteed cure and relief of symptoms is often dependent on the severity of the underlying disease.  The patient also understands that traditional pharmaceutical treatments for this diagnosis are usually unavailable to post-operative weight loss patients due to the effects on the gastrointestinal tract particularly with the gastric bypass and to a lesser effect with the sleeve gastrectomy.  Any changes to the patients medication treatment will ultimately be made the patients PCP with input by our office.         Signed By: José Miguel Rueda MD     August 21, 2017

## 2017-08-30 ENCOUNTER — APPOINTMENT (OUTPATIENT)
Dept: GENERAL RADIOLOGY | Age: 62
DRG: 621 | End: 2017-08-30
Attending: SPECIALIST
Payer: COMMERCIAL

## 2017-08-30 VITALS
TEMPERATURE: 98.5 F | OXYGEN SATURATION: 98 % | BODY MASS INDEX: 43.02 KG/M2 | WEIGHT: 252 LBS | SYSTOLIC BLOOD PRESSURE: 144 MMHG | HEART RATE: 60 BPM | RESPIRATION RATE: 18 BRPM | DIASTOLIC BLOOD PRESSURE: 84 MMHG | HEIGHT: 64 IN

## 2017-08-30 PROCEDURE — 74240 X-RAY XM UPR GI TRC 1CNTRST: CPT

## 2017-08-30 PROCEDURE — 74011636320 HC RX REV CODE- 636/320: Performed by: SPECIALIST

## 2017-08-30 PROCEDURE — 74011250636 HC RX REV CODE- 250/636: Performed by: SPECIALIST

## 2017-08-30 RX ORDER — OXYCODONE AND ACETAMINOPHEN 5; 325 MG/1; MG/1
1 TABLET ORAL
Status: DISCONTINUED | OUTPATIENT
Start: 2017-08-30 | End: 2017-08-30 | Stop reason: HOSPADM

## 2017-08-30 RX ORDER — METOCLOPRAMIDE HYDROCHLORIDE 5 MG/ML
10 INJECTION INTRAMUSCULAR; INTRAVENOUS EVERY 6 HOURS
Status: DISCONTINUED | OUTPATIENT
Start: 2017-08-30 | End: 2017-08-30 | Stop reason: HOSPADM

## 2017-08-30 RX ADMIN — CEFAZOLIN SODIUM 2 G: 2 SOLUTION INTRAVENOUS at 01:20

## 2017-08-30 RX ADMIN — HYDROMORPHONE HYDROCHLORIDE 1 MG: 1 INJECTION, SOLUTION INTRAMUSCULAR; INTRAVENOUS; SUBCUTANEOUS at 05:19

## 2017-08-30 RX ADMIN — ONDANSETRON 4 MG: 2 INJECTION INTRAMUSCULAR; INTRAVENOUS at 01:10

## 2017-08-30 RX ADMIN — ENOXAPARIN SODIUM 40 MG: 40 INJECTION SUBCUTANEOUS at 14:41

## 2017-08-30 RX ADMIN — ACETAMINOPHEN 1000 MG: 10 INJECTION, SOLUTION INTRAVENOUS at 05:09

## 2017-08-30 RX ADMIN — HYDROMORPHONE HYDROCHLORIDE 1 MG: 1 INJECTION, SOLUTION INTRAMUSCULAR; INTRAVENOUS; SUBCUTANEOUS at 01:20

## 2017-08-30 RX ADMIN — ONDANSETRON 4 MG: 2 INJECTION INTRAMUSCULAR; INTRAVENOUS at 08:05

## 2017-08-30 RX ADMIN — METOCLOPRAMIDE 10 MG: 5 INJECTION, SOLUTION INTRAMUSCULAR; INTRAVENOUS at 11:24

## 2017-08-30 RX ADMIN — IOHEXOL 70 ML: 240 INJECTION, SOLUTION INTRATHECAL; INTRAVASCULAR; INTRAVENOUS; ORAL at 08:47

## 2017-08-30 NOTE — DISCHARGE INSTRUCTIONS
[facility]  Lee Ann Console. Chad Levine M.D., F.A.C.S.  [address]. [address] [place] [address] Rd, [address]  Office: [phone]    Fax:  [phone]    Discharge Instruction for Gastric Bypass / Sleeve Gastrectomy Patients    Diet:   Continue with the liquid diet until you are seen in the office. Make sure you sip fluids all day. Aim for  Gms of protein every day. Activity:   Walking is encouraged. Rest when you are tired.  You may shower.  You may climb stairs. Take your time.  No lifting more than 10-15 lbs.  If you feel discomfort during an activity, rest.   Do not drive for 1 week. Wound Care:   Clean incisions with soap and water when in the shower. Pat dry.  Leave steri-strips on until they fall off.  A small amount of drainage may be present from the incisions. Contact the office if you notice an increase in drainage, an odor, increased redness, or fever > 100.5. Medications:   You will receive a prescription for pain medication at the time of discharge.  For upset stomach you may take over the counter medications such as Maalox, Mylanta, Pepcid, Zantac, or Tagamet.  You may take Tylenol   Non-aspirin based arthritis medications may be resumed after the first month.  Take a childrens or adult chewable multivitamin.  Milk of Magnesia will help with constipation.  It is fine to take your usual home medications. Blood pressure medications should be continued after surgery. Diabetic medications can frequently be reduced very quickly after surgery. Diabetics should continue to monitor blood sugars frequently after surgery and contact the prescribing physician for any questions. Follow-Up Appointment:   If you do not already have a follow-up appointment scheduled, contact the office in the next few days to obtain one. It is usually scheduled for 10-14 days after you surgery date. Office phone number:  [phone].       Patient armband removed and shredded    DISCHARGE SUMMARY from Nurse    The following personal items are in your possession at time of discharge:    Dental Appliances: None  Visual Aid: Glasses     Home Medications: None  Jewelry: None  Clothing: Undergarments, Dress, Footwear (with spouse)  Other Valuables: Eyeglasses, Other (comment) (ID - wit spouse)             PATIENT INSTRUCTIONS:    After general anesthesia or intravenous sedation, for 24 hours or while taking prescription Narcotics:  · Limit your activities  · Do not drive and operate hazardous machinery  · Do not make important personal or business decisions  · Do  not drink alcoholic beverages  · If you have not urinated within 8 hours after discharge, please contact your surgeon on call. Report the following to your surgeon:  · Excessive pain, swelling, redness or odor of or around the surgical area  · Temperature over 100.5  · Nausea and vomiting lasting longer than 4 hours or if unable to take medications  · Any signs of decreased circulation or nerve impairment to extremity: change in color, persistent  numbness, tingling, coldness or increase pain  · Any questions        What to do at Home:  Recommended activity: Activity as tolerated, Ambulate in house, No lifting, Driving, or Strenuous exercise and No driving while on analgesics    If you experience any of the following symptoms fever greater than 100.4, fowl/discolored drainage, pain uncontrolled please follow up with Ed or Dr. David Raymond. *  Please give a list of your current medications to your Primary Care Provider. *  Please update this list whenever your medications are discontinued, doses are      changed, or new medications (including over-the-counter products) are added. *  Please carry medication information at all times in case of emergency situations.           These are general instructions for a healthy lifestyle:    No smoking/ No tobacco products/ Avoid exposure to second hand smoke    Surgeon General's Warning:  Quitting smoking now greatly reduces serious risk to your health. Obesity, smoking, and sedentary lifestyle greatly increases your risk for illness    A healthy diet, regular physical exercise & weight monitoring are important for maintaining a healthy lifestyle    You may be retaining fluid if you have a history of heart failure or if you experience any of the following symptoms:  Weight gain of 3 pounds or more overnight or 5 pounds in a week, increased swelling in our hands or feet or shortness of breath while lying flat in bed. Please call your doctor as soon as you notice any of these symptoms; do not wait until your next office visit. Recognize signs and symptoms of STROKE:    F-face looks uneven    A-arms unable to move or move unevenly    S-speech slurred or non-existent    T-time-call 911 as soon as signs and symptoms begin-DO NOT go       Back to bed or wait to see if you get better-TIME IS BRAIN. Warning Signs of HEART ATTACK     Call 911 if you have these symptoms:   Chest discomfort. Most heart attacks involve discomfort in the center of the chest that lasts more than a few minutes, or that goes away and comes back. It can feel like uncomfortable pressure, squeezing, fullness, or pain.  Discomfort in other areas of the upper body. Symptoms can include pain or discomfort in one or both arms, the back, neck, jaw, or stomach.  Shortness of breath with or without chest discomfort.  Other signs may include breaking out in a cold sweat, nausea, or lightheadedness. Don't wait more than five minutes to call 911 - MINUTES MATTER! Fast action can save your life. Calling 911 is almost always the fastest way to get lifesaving treatment. Emergency Medical Services staff can begin treatment when they arrive -- up to an hour sooner than if someone gets to the hospital by car. The discharge information has been reviewed with the patient and spouse.   The patient and spouse verbalized understanding. Discharge medications reviewed with the patient and spouse and appropriate educational materials and side effects teaching were provided.               REV  09/2010

## 2017-08-30 NOTE — PROGRESS NOTES
conducted an initial consultation and Spiritual Assessment for Stepan Ramirez, who is a 57 y.o.,female. Patients Primary Language is: [place]. According to the patients EMR Quaker Affiliation is: No preference. The reason the Patient came to the hospital is:   Patient Active Problem List    Diagnosis Date Noted    Morbid obesity with body mass index of 40.0-49.9 ([address]     Morbid obesity ([address]     GERD (gastroesophageal reflux disease)     Arthritic-like pain     Morbid obesity with BMI of 40.0-44.9, adult ([address]     Hypercholesterolemia     Chronic back pain     Snores         The  provided the following Interventions:  Initiated a relationship of care and support. Explored issues of olena, belief, spirituality and Taoist/ritual needs while hospitalized. Listened empathically. Provided chaplaincy education. Provided information about Spiritual Care Services. Offered prayer and assurance of continued prayers on patients behalf. Chart reviewed. The following outcomes were achieved:  Patient shared limited information about both their medical narrative and spiritual journey/beliefs. Patient processed feeling about current hospitalization. Patient expressed gratitude for pastoral care visit. Assessment:  Patient does not have any Taoist/cultural needs that will affect patients preferences in health care. There are no further spiritual or Taoist issues which require intervention at this time. Plan:  Chaplains will continue to follow and will provide pastoral care on an as needed/requested basis.  recommends bedside caregivers page  on duty if patient shows signs of acute spiritual or emotional distress.       Sister Shira Andrade, [place], [address] [phone]

## 2017-08-30 NOTE — PROGRESS NOTES
Shift summary    0730 pt complained of nausea, mediated via MAR  0940 Pt vomiting post gi xray, notifed Dr. Vidhya Abbasi, new orders received.

## 2017-08-30 NOTE — PROGRESS NOTES
Shift Summary:  Patient received Dilaudid 1 mg IV x 2 for pain rating at 8-10/10 and Zofran 4 mg IV x 1 for nausea overnight. Lap sites x 5 CDI with MAGGIE drain intact, 20 ml serosanguineous drainage. Hypoactive bowel sounds. Patient denies passing flatus, has been burping. Ambulated in hallway x 2. Spouse at bedside.     Patient Vitals for the past 12 hrs:   Temp Pulse Resp BP SpO2   08/30/17 0402 98.5 °F (36.9 °C) 73 18 132/73 100 %   08/29/17 2347 98.8 °F (37.1 °C) 61 18 153/85 100 %   08/29/17 2027 98.1 °F (36.7 °C) 60 16 139/83 96 %

## 2017-08-30 NOTE — ROUTINE PROCESS
Dual AVS reviewed with Jed Leventhal, RN. All medications reviewed individually with patient. Opportunities for questions and concerns provided. Patient discharged via (mode of transport ie. Car, ambulance or air transport) wheelchair. Patient's arm band appropriately discarded.

## 2017-08-30 NOTE — PROGRESS NOTES
Bariatric Surgery                POD #1    Visit Vitals    /73 (BP 1 Location: Right arm, BP Patient Position: At rest)    Pulse 73    Temp 98.5 °F (36.9 °C)    Resp 18    Ht 5' 4\" (1.626 m)    Wt 114.3 kg (252 lb)    SpO2 100%    BMI 43.26 kg/m2     Patient has minimal complaints of pain, minimal nausea noted     Exam:  Appears well in no distress  Lungs- clear bilaterally  Abd - soft, incisions look good without erythema           MAGGIE with minimal serosanguinous output  Extremities- no new edema or swelling    UGI - pending    Data Review:    Labs: Results:       Chemistry No results for input(s): GLU, NA, K, CL, CO2, BUN, CREA, CA, AGAP, BUCR, TBIL, GPT, AP, TP, ALB, GLOB, AGRAT in the last 72 hours. CBC w/Diff No results for input(s): WBC, RBC, HGB, HCT, PLT, GRANS, LYMPH, EOS, RETIC, HGBEXT, HCTEXT, PLTEXT in the last 72 hours. Coagulation No results for input(s): PTP, INR, APTT in the last 72 hours. No lab exists for component: INREXT    Liver Enzymes No results for input(s): TP, ALB, TBIL, AP, SGOT, GPT in the last 72 hours. No lab exists for component: DBIL       Assessment/Plan: S/P  laparoscopic sleeve gastrectomy - doing well without any issues      Following orders after UGI confirmed normal -     1. Start bariatric diet and protein shakes  2. D/C IV pain meds and start PO pain meds  3. D/C MAGGIE drain  4. Likley PM D/C if  Cont ok and tolerate PO

## 2017-08-30 NOTE — DISCHARGE SUMMARY
Discharge Summary    Patient: Mckenna Joy               Sex: female          DOA: 8/29/2017         YOB: 1955      Age:  57 y.o.        LOS:  LOS: 1 day                Admit Date: 8/29/2017    Discharge Date: 8/30/2017    Admission Diagnoses: MORBID OBESITY,GERD,BMI 36  Morbid obesity with BMI of 40.0-44.9, adult [facility])    Discharge Diagnoses:    Problem List as of 8/30/2017  Date Reviewed: 8/29/2017          Codes Class Noted - Resolved    Morbid obesity with body mass index of 40.0-49.9 (HCC) ICD-10-CM: E66.01  ICD-9-CM: 278.01  Unknown - Present        Morbid obesity ([address] ICD-10-CM: E66.01  ICD-9-CM: 278.01  Unknown - Present        GERD (gastroesophageal reflux disease) ICD-10-CM: K21.9  ICD-9-CM: 530.81  Unknown - Present        Arthritic-like pain ICD-10-CM: M25.50  ICD-9-CM: 719.40  Unknown - Present        Morbid obesity with BMI of 40.0-44.9, adult (HCC) ICD-10-CM: E66.01, Z68.41  ICD-9-CM: 278.01, V85.41  Unknown - Present        Hypercholesterolemia ICD-10-CM: E78.00  ICD-9-CM: 272.0  Unknown - Present        Chronic back pain ICD-10-CM: M54.9, G89.29  ICD-9-CM: 724.5, 338.29  Unknown - Present        Snores ICD-10-CM: R06.83  ICD-9-CM: 786.09  Unknown - Present              Discharge Condition: Good    Hospital Course: The patient underwent  laparoscopic sleeve gastrectomy  on 8/29/2017. The patient tolerated the procedure well. Vital signs remained stable and the patient was transferred to  3rd floor surgical unit without complications. The patient remained stable throughout the first night post operatively with stable vital signs and adequate urine output and pain control. Pain was controlled with Dilaudid IV and IV Tylenol . The patient on the first morning post operative was transferred to the radiology suite where they underwent a gastrograffin UGI study which showed no evidence of a leak or stricture.  The drain was discontinued on POD # 1 and the patient was started on a bariatric liquid diet with protein shakes. The patient progressed throughout the day and was ambulating well and tolerating their diet. They were therefore discharged home with instructions to notify me with any issues that may arise. Significant Diagnostic Studies:   No results for input(s): HGB, HGBEXT in the last 72 hours. No results for input(s): HCT, HCTEXT in the last 72 hours. There are no discharge medications for this patient. Activity: activity as tolerated with no heavy lifting of greater than 20 pounds. No anti- inflammatory medications. Use stool softeners at home as needed while taking pain medications since they are constipating. Diet: Bariatric liquid diet    Wound Care: Keep wound clean and dry, Reinforce dressing PRN and ice to area for comfort. Do not get wound wet for 2 days.     Follow-up: 14 days with Dr Massiel Smith M.D

## 2017-08-30 NOTE — ROUTINE PROCESS
Bedside and Verbal shift change report given to JAMES Duong RN (oncoming nurse) by Becky Yates (offgoing nurse). Report included the following information SBAR, Kardex, Intake/Output and MAR.

## 2017-08-30 NOTE — PROGRESS NOTES
2014 rec'd care of pt, bedside report given by Sharyle Overall, RN. Pt requesting pain medication. Dressings CDI. MAGGIE intact and patent. Pt resting in bed,  at bedside. 2045 pt c/o of sharp pain, encouraged pt to walk. Was able to walk a short distance with mild pain but toelrated well. 2310 pt requested to walk again, tolerated much better this time. Pt also urniated, clear yellow urine. Shift summary: pt pleasant and cooperative with care. Pt up with this nurse, ambulating in hallway, tolerating well.

## 2017-08-30 NOTE — ROUTINE PROCESS
Bedside shift change report given to Carrie Porter RN (oncoming nurse) by Yasmine Holt RN   (offgoing nurse). Report included the following information SBAR, Kardex, OR Summary, Intake/Output, MAR, Recent Results and Med Rec Status.

## 2017-09-14 ENCOUNTER — OFFICE VISIT (OUTPATIENT)
Dept: SURGERY | Age: 62
End: 2017-09-14

## 2017-09-14 VITALS
HEIGHT: 64 IN | WEIGHT: 222.8 LBS | DIASTOLIC BLOOD PRESSURE: 73 MMHG | BODY MASS INDEX: 38.04 KG/M2 | RESPIRATION RATE: 16 BRPM | SYSTOLIC BLOOD PRESSURE: 111 MMHG | HEART RATE: 83 BPM | OXYGEN SATURATION: 100 %

## 2017-09-14 DIAGNOSIS — K90.9 INTESTINAL MALABSORPTION, UNSPECIFIED TYPE: Primary | ICD-10-CM

## 2017-09-14 DIAGNOSIS — Z98.890 S/P GASTRIC SURGERY: ICD-10-CM

## 2017-09-14 RX ORDER — BISMUTH SUBSALICYLATE 262 MG
1 TABLET,CHEWABLE ORAL DAILY
COMMUNITY
End: 2018-01-10

## 2017-09-14 NOTE — PROGRESS NOTES
Subjective: Dawn Menjivar is a 57 y.o. female is now 2 weeks status post laparoscopic sleeve gastrectomy with diaphragmatic hernia repair. Doing well overall. She has lost a total of 21 pounds since surgery. Body mass index is 38.24 kg/(m^2). Currently on a liquid diet without difficulty. Taking in 32oz water daily. Sources of protein include protein shakes. 20 min of activity 7 days a week, including walking. Bowel movements are constipated. The patient is not having any pain. . The patient is compliant with multivitamins. Surgery related complications: none. Liver bx report reviewed with patient. Stomach bx report reviewed with patient.     Weight Loss Metrics 9/14/2017 8/30/2017 8/29/2017 8/21/2017 8/15/2017 7/31/2017 7/18/2016   Today's Wt 222 lb 12.8 oz 252 lb - 244 lb 240 lb 242 lb 242 lb   BMI 38.24 kg/m2 - 43.26 kg/m2 41.88 kg/m2 41.2 kg/m2 40.27 kg/m2 40.27 kg/m2          Comorbidities:    Hypertension: not applicable  Diabetes: not applicable  Obstructive Sleep Apnea: not applicable  Hyperlipidemia: not applicable  Stress Urinary Incontinence: not applicable  Gastroesophageal Reflux: not applicable  Weight related arthropathy:not applicable     Patient Active Problem List   Diagnosis Code    Hypercholesterolemia E78.00    Chronic back pain M54.9, G89.29    Snores R06.83    Morbid obesity ([address] E66.01    GERD (gastroesophageal reflux disease) K21.9    Arthritic-like pain M25.50    BMI 38.0-38.9,adult Z68.38    S/P gastric surgery Z98.890    Intestinal malabsorption K90.9        Past Medical History:   Diagnosis Date    Arthritic-like pain     Chronic back pain     Chronic pain     back pain    GERD (gastroesophageal reflux disease)     Hypercholesterolemia     Morbid obesity ([address]     Morbid obesity with body mass index of 40.0-49.9 ([facility])     Snores     has not perfomed a sleep study       Past Surgical History:   Procedure Laterality Date    HX LIPOSUCTION      HX TOTAL ABDOMINAL HYSTERECTOMY         Current Outpatient Prescriptions   Medication Sig Dispense Refill    multivitamin (ONE A DAY) tablet Take 1 Tab by mouth daily. No Known Allergies      Objective:     Visit Vitals    /73 (BP 1 Location: Right arm, BP Patient Position: Sitting)    Pulse 83    Resp 16    Ht 5' 4\" (1.626 m)    Wt 101.1 kg (222 lb 12.8 oz)    SpO2 100%    BMI 38.24 kg/m2       General:  alert, cooperative, no distress, appears stated age   Chest: lungs clear to auscultation, no accessory muscle use   Cor:   Regular rate and rhythm   Abdomen: soft, bowel sounds active, non-tender, no masses or organomegaly   Incisions:   healing well, no drainage, no erythema, no hernia, no seroma, no swelling, no dehiscence, incision well approximated       Assessment:   History of Morbid obesity, status post laparoscopic sleeve gastrectomy. Doing well postoperatively. Plan:     1. Consider Miralax for constipation  2. Advance diet to soft solid phase. Reminded to measure portions, continue high protein, low carbohydrate diet. Reminded to eat regularly, to eat slowly & not to drink with meals. Refer to the handbook given in class. 3. Continue multivitamin   4. Continue current medications and follow up with PCP for management of regimen. 5. Encouraged to attend support group   6. I have discussed this plan with patient and they verbalized understanding  7. Follow up in 2 weeks or sooner if patient has questions, concerns or worsening of condition, if unable to reach our office, patient should report to the ED. 8. Ms. Tarah Arnold has a reminder for a \"due or due soon\" health maintenance. I have asked that she contact her primary care provider for a follow-up on this health maintenance.

## 2017-09-14 NOTE — MR AVS SNAPSHOT
Visit Information Date & Time Provider Department Dept. Phone Encounter #  
 9/14/2017  8:30 AM Yani Arriaza, [address] [facility] [place] [id number] Follow-up Instructions Return in about 2 weeks (around 9/28/2017) for Appt with Dietician too. Carina Damon Your Appointments 9/29/2017  9:00 AM  
Office Visit with Enrique Rutledge MD  
[address] [place] [address] [facility]) Appt Note: 1 mo po  
 [address] [facility] [address]  
[phone]  
  
   
 [address] [facility]  
  
    
 9/29/2017  9:30 AM  
Office Visit with MERI CARPIO VISIT2 [address] [place] [address] [facility]) Appt Note: 1 mo po  
 [address] [facility] [address]  
  
   
 [address] [facility] Upcoming Health Maintenance Date Due Hepatitis C Screening 1955 DTaP/Tdap/Td series (1 - Tdap) 8/23/1974 PAP AKA CERVICAL CYTOLOGY 8/23/1974 BREAST CANCER SCRN MAMMOGRAM 8/23/2003 FOBT Q 1 YEAR AGE 50-75 8/23/2003 ZOSTER VACCINE AGE 60> 6/23/2013 INFLUENZA AGE 9 TO ADULT 8/1/2017 Allergies as of 9/14/2017  Review Complete On: 9/14/2017 By: NARINDER Cruz No Known Allergies Current Immunizations  Never Reviewed No immunizations on file. Not reviewed this visit You Were Diagnosed With   
  
 Codes Comments Intestinal malabsorption, unspecified type    -  Primary ICD-10-CM: K90.9 ICD-9-CM: 579.9 S/P gastric surgery     ICD-10-CM: G01.339 ICD-9-CM: V45.89 BMI 38.0-38.9,adult     ICD-10-CM: T22.83 
ICD-9-CM: V85.38 Vitals BP Pulse Resp Height(growth percentile) Weight(growth percentile) SpO2  
 111/73 (BP 1 Location: Right arm, BP Patient Position: Sitting) 83 16 5' 4\" (1.626 m) 222 lb 12.8 oz (101.1 kg) 100% BMI OB Status Smoking Status 38.24 kg/m2 Hysterectomy Never Smoker Vitals History BMI and BSA Data Body Mass Index Body Surface Area  
 38.24 kg/m 2 2.14 m 2 Your Updated Medication List  
  
   
This list is accurate as of: 9/14/17  9:17 AM.  Always use your most recent med list.  
  
  
  
  
 multivitamin tablet Commonly known as:  ONE A DAY Take 1 Tab by mouth daily. Follow-up Instructions Return in about 2 weeks (around 9/28/2017) for Appt with Dietician too. Reynaldo Daily Patient Instructions Patient Instructions 1. Remember hydration goals - minimum of 64 ounces of liquids per day (dehydration is the number one reason for hospital readmission). 2. Continue to monitor carbohydrate and protein intake you need a minimum of  Grams of protein daily- remember to keep your total carbohydrates to 50 grams or less per day for best results. 3. Continue to work towards exercise goals - 60-90 minutes, 5 times a week minimum of deliberate, aerobic exercise is the ultimate goal with strength training 2 times each week. Refer to sfilatino for  information. 4. Remember to take multi vitamins as directed in your handbook. 5. Attend support group the 2nd Thursday of each month. 6. Use Miralax if you become constipated. 7. Call us at [phone] or email us through [employer]" with questions,     concerns or worsening of condition, we have someone on call 24 hours a day. If you are unable to reach our office, you are to go to your Primary Care Physician or the Emergency Department. Supplement Resource Guide Importance of Protein:  
Maintains lean body mass, produces antibodies to fight off infections, heals wounds, minimizes hair loss, helps to give you energy, helps with satiety, and keeping you full between meals. Importance of Calcium: Needed for healthy bones and teeth, normal blood clotting, and nervous system functioning, higher risk of osteoporosis and bone disease with non-compliance. Importance of Multivitamins: Many functions. Supply you with extra nutrients that you may be missing from food. May lead to iron deficiency anemia, weakness, fatigue, and many other symptoms with non-compliance. Importance of B Vitamins: 
Important for red blood cell formation, metabolism, energy, and helps to maintain a healthy nervous system. Protein Supplement Find one you like now. Use immediately after surgery. Look for: 
35-50g protein each day from your protein supplement once you reach the progression diet. 0-3 g fat per serving 0-3 g sugar per serving Protein drinks should be split in separate dosages. Recommend: Lifelong 1 year + Calcium Supplement:  
 
Start taking within a month after surgery. Look for: Calcium Citrate Plus D (1500 mg per day) Recommend: Citracal 
 
 . Avoid chocolate chewable calcium. Can use chewable bariatric or [employer]NC brand or similar chewable. The body cannot absorb more than 500-600 mg of calcium at a time. Take for Life Multi-vitamin Supplement:   
 
Start immediately after surgery: any complete chewable, such as: [place]s Complete chewables. Avoid [place] sours or gummies. They lack iron and other important nutrients and also have added sugar. Continue with chewable vitamin or change to adult complete multivitamin one month after surgery. Menstruating women can take a prenatal vitamin. Make sure has at least 18 mg iron and 072-982 mcg folic acid Vitamin B12, B Complex Vitamin, and Biotin Start taking within a month after surgery. Vitamin B12:  1000 mcg of Vitamin B12 three times weekly Must take sublingually (meaning you take it under your tongue) or in a liquid drop form for easy absorption. B Complex Vitamin: Take a pill or liquid drop form once daily. Biotin: This vitamin can help prevent hair loss. Recommend 5mg  
(5000 mcg) a day Biotin is Optional  
 
 
 
 
 
  
Introducing [facility]! Ruby Meyer introduces Pocket Video patient portal. Now you can access parts of your medical record, email your doctor's office, and request medication refills online. 1. In your internet browser, go to [email or web address] 2. Click on the First Time User? Click Here link in the Sign In box. You will see the New Member Sign Up page. 3. Enter your Pocket Video Access Code exactly as it appears below. You will not need to use this code after youve completed the sign-up process. If you do not sign up before the expiration date, you must request a new code. · Pocket Video Access Code: [email or web address] Expires: 10/29/2017  9:58 AM 
 
4. Enter the last four digits of your Social Security Number (xxxx) and Date of Birth (mm/dd/yyyy) as indicated and click Submit. You will be taken to the next sign-up page. 5. Create a Pocket Video ID. This will be your Pocket Video login ID and cannot be changed, so think of one that is secure and easy to remember. 6. Create a Pocket Video password. You can change your password at any time. 7. Enter your Password Reset Question and Answer. This can be used at a later time if you forget your password. 8. Enter your e-mail address. You will receive e-mail notification when new information is available in [address] 9. Click Sign Up. You can now view and download portions of your medical record. 10. Click the Download Summary menu link to download a portable copy of your medical information. If you have questions, please visit the Frequently Asked Questions section of the Pocket Video website. Remember, Pocket Video is NOT to be used for urgent needs. For medical emergencies, dial 911. Now available from your iPhone and Android! Please provide this summary of care documentation to your next provider. Your primary care clinician is listed as Janette Shana. If you have any questions after today's visit, please call [phone].

## 2017-09-14 NOTE — PATIENT INSTRUCTIONS
Patient Instructions      1. Remember hydration goals - minimum of 64 ounces of liquids per day (dehydration is the number one reason for hospital readmission). 2. Continue to monitor carbohydrate and protein intake you need a minimum of  Grams of protein daily- remember to keep your total carbohydrates to 50 grams or less per day for best results. 3. Continue to work towards exercise goals - 60-90 minutes, 5 times a week minimum of deliberate, aerobic exercise is the ultimate goal with strength training 2 times each week. Refer to [employer] for  information. 4. Remember to take multi vitamins as directed in your handbook. 5. Attend support group the 2nd Thursday of each month. 6. Use Miralax if you become constipated. 7. Call us at [phone] or email us through [place]" with questions,     concerns or worsening of condition, we have someone on call 24 hours a day. If you are unable to reach our office, you are to go to your Primary Care Physician or the Emergency Department. Supplement Resource Guide    Importance of Protein:   Maintains lean body mass, produces antibodies to fight off infections, heals wounds, minimizes hair loss, helps to give you energy, helps with satiety, and keeping you full between meals. Importance of Calcium:  Needed for healthy bones and teeth, normal blood clotting, and nervous system functioning, higher risk of osteoporosis and bone disease with non-compliance. Importance of Multivitamins: Many functions. Supply you with extra nutrients that you may be missing from food. May lead to iron deficiency anemia, weakness, fatigue, and many other symptoms with non-compliance. Importance of B Vitamins:  Important for red blood cell formation, metabolism, energy, and helps to maintain a healthy nervous system. Protein Supplement  Find one you like now.  Use immediately after surgery. Look for:  35-50g protein each day from your protein supplement once you reach the progression diet. 0-3 g fat per serving  0-3 g sugar per serving    Protein drinks should be split in separate dosages. Recommend: Lifelong  1 year + Calcium Supplement:     Start taking within a month after surgery. Look for: Calcium Citrate Plus D (1500 mg per day)  Recommend: Citracal     .            Avoid chocolate chewable calcium. Can use chewable bariatric or GNC brand or similar chewable. The body cannot absorb more than 500-600 mg of calcium at a time. Take for Life Multi-vitamin Supplement:      Start immediately after surgery: any complete chewable, such as: [place]s Complete chewables. Avoid [place] sours or gummies. They lack iron and other important nutrients and also have added sugar. Continue with chewable vitamin or change to adult complete multivitamin one month after surgery. Menstruating women can take a prenatal vitamin. Make sure has at least 18 mg iron and 160-856 mcg folic acid   Vitamin N21, B Complex Vitamin, and Biotin  Start taking within a month after surgery. Vitamin B12:  1000 mcg of Vitamin B12 three times weekly    Must take sublingually (meaning you take it under your tongue) or in a liquid drop form for easy absorption. B Complex Vitamin: Take a pill or liquid drop form once daily. Biotin: This vitamin can help prevent hair loss.     Recommend 5mg   (5000 mcg) a day  Biotin is Optional

## 2017-09-29 ENCOUNTER — OFFICE VISIT (OUTPATIENT)
Dept: SURGERY | Age: 62
End: 2017-09-29

## 2017-09-29 VITALS — WEIGHT: 218 LBS | BODY MASS INDEX: 37.22 KG/M2 | HEIGHT: 64 IN

## 2017-09-29 VITALS
BODY MASS INDEX: 37.22 KG/M2 | SYSTOLIC BLOOD PRESSURE: 100 MMHG | HEIGHT: 64 IN | RESPIRATION RATE: 16 BRPM | WEIGHT: 218 LBS | HEART RATE: 66 BPM | OXYGEN SATURATION: 100 % | DIASTOLIC BLOOD PRESSURE: 69 MMHG

## 2017-09-29 DIAGNOSIS — K90.9 INTESTINAL MALABSORPTION, UNSPECIFIED TYPE: Primary | ICD-10-CM

## 2017-09-29 DIAGNOSIS — Z98.890 S/P GASTRIC SURGERY: ICD-10-CM

## 2017-09-29 DIAGNOSIS — Z98.890 S/P GASTRIC SURGERY: Primary | ICD-10-CM

## 2017-09-29 NOTE — PATIENT INSTRUCTIONS
Body Mass Index: Care Instructions  Your Care Instructions    Body mass index (BMI) can help you see if your weight is raising your risk for health problems. It uses a formula to compare how much you weigh with how tall you are. · A BMI lower than 18.5 is considered underweight. · A BMI between 18.5 and 24.9 is considered healthy. · A BMI between 25 and 29.9 is considered overweight. A BMI of 30 or higher is considered obese. If your BMI is in the normal range, it means that you have a lower risk for weight-related health problems. If your BMI is in the overweight or obese range, you may be at increased risk for weight-related health problems, such as high blood pressure, heart disease, stroke, arthritis or joint pain, and diabetes. If your BMI is in the underweight range, you may be at increased risk for health problems such as fatigue, lower protection (immunity) against illness, muscle loss, bone loss, hair loss, and hormone problems. BMI is just one measure of your risk for weight-related health problems. You may be at higher risk for health problems if you are not active, you eat an unhealthy diet, or you drink too much alcohol or use tobacco products. Follow-up care is a key part of your treatment and safety. Be sure to make and go to all appointments, and call your doctor if you are having problems. It's also a good idea to know your test results and keep a list of the medicines you take. How can you care for yourself at home? · Practice healthy eating habits. This includes eating plenty of fruits, vegetables, whole grains, lean protein, and low-fat dairy. · If your doctor recommends it, get more exercise. Walking is a good choice. Bit by bit, increase the amount you walk every day. Try for at least 30 minutes on most days of the week. · Do not smoke. Smoking can increase your risk for health problems. If you need help quitting, talk to your doctor about stop-smoking programs and medicines. These can increase your chances of quitting for good. · Limit alcohol to 2 drinks a day for men and 1 drink a day for women. Too much alcohol can cause health problems. If you have a BMI higher than 25  · Your doctor may do other tests to check your risk for weight-related health problems. This may include measuring the distance around your waist. A waist measurement of more than 40 inches in men or 35 inches in women can increase the risk of weight-related health problems. · Talk with your doctor about steps you can take to stay healthy or improve your health. You may need to make lifestyle changes to lose weight and stay healthy, such as changing your diet and getting regular exercise. If you have a BMI lower than 18.5  · Your doctor may do other tests to check your risk for health problems. · Talk with your doctor about steps you can take to stay healthy or improve your health. You may need to make lifestyle changes to gain or maintain weight and stay healthy, such as getting more healthy foods in your diet and doing exercises to build muscle. Where can you learn more? Go to http://freddie-derek.info/. Enter S176 in the search box to learn more about \"Body Mass Index: Care Instructions. \"  Current as of: January 23, 2017  Content Version: 11.3  © 2159-8287 [employer]. Care instructions adapted under license by [employer] (which disclaims liability or warranty for this information). If you have questions about a medical condition or this instruction, always ask your healthcare professional. [place] [address] any warranty or liability for your use of this information.

## 2017-09-29 NOTE — PROGRESS NOTES
Pt given one on one diet education. Appears to have a good understanding of the diet progression, food choices, and dietary/exercise habits for successful weight loss and nourishment one month after surgery. The  material included: post-op diet progression and portion sizes (including low fat, low sugar food recommendations and emphasis on protein foods and protein supplements), good beverage choices, reading a food label, vitamins/minerals required after weight loss surgery, and encouraging dietary and exercise habits that lead to weight loss success. Pt also received a restaurant card, which tells restaurants that the patient had a procedure that decreases the size of their stomach so the restaurant may let them order off the children's menu, the senior's menu, or a smaller portion for a reduced rate. Norman Guera    Pt does not think she is consuming 48 ounces of fluid - she states she gets very full, so she's had to slow down on her water consumption. Pt is eating ice chips throughout the day, as a way to get in her fluid. Pt is drinking a protein shake NutriWise, which she counts towards her total fluid. Drinks about 32 ounces of protein shake (she mixes the protein powder with water), plus ice chips.

## 2017-09-29 NOTE — MR AVS SNAPSHOT
Visit Information Date & Time Provider Department Dept. Phone Encounter #  
 9/29/2017  9:00 AM Tashi Valente [facility] [phone] [id number] Follow-up Instructions Return in about 1 month (around 10/29/2017). Follow-up and Disposition History Your Appointments 10/27/2017  9:30 AM  
POST OP with NARINDER Stevenson [employer] [facility] ([address]) Appt Note: 2 mo po  
 [address] [facility] [address]  
  
   
 [address] [place] [address] Upcoming Health Maintenance Date Due Hepatitis C Screening 1955 DTaP/Tdap/Td series (1 - Tdap) 8/23/1974 PAP AKA CERVICAL CYTOLOGY 8/23/1974 BREAST CANCER SCRN MAMMOGRAM 8/23/2003 FOBT Q 1 YEAR AGE 50-75 8/23/2003 ZOSTER VACCINE AGE 60> 6/23/2013 INFLUENZA AGE 9 TO ADULT 8/1/2017 Allergies as of 9/29/2017  Review Complete On: 9/29/2017 By: NARINDER Espinosa No Known Allergies Current Immunizations  Never Reviewed No immunizations on file. Not reviewed this visit You Were Diagnosed With   
  
 Codes Comments Intestinal malabsorption, unspecified type    -  Primary ICD-10-CM: K90.9 ICD-9-CM: 579.9 S/P gastric surgery     ICD-10-CM: Q90.670 ICD-9-CM: V45.89 Vitals BP Pulse Resp Height(growth percentile) Weight(growth percentile) SpO2  
 100/69 (BP 1 Location: Left arm, BP Patient Position: Sitting) 66 16 5' 4\" (1.626 m) 218 lb (98.9 kg) 100% BMI OB Status Smoking Status 37.42 kg/m2 Hysterectomy Never Smoker Vitals History BMI and BSA Data Body Mass Index Body Surface Area  
 37.42 kg/m 2 2.11 m 2 Your Updated Medication List  
  
   
This list is accurate as of: 9/29/17 10:44 AM.  Always use your most recent med list.  
  
  
  
  
 multivitamin tablet Commonly known as:  ONE A DAY Take 1 Tab by mouth daily. Follow-up Instructions Return in about 1 month (around 10/29/2017). Patient Instructions Body Mass Index: Care Instructions Your Care Instructions Body mass index (BMI) can help you see if your weight is raising your risk for health problems. It uses a formula to compare how much you weigh with how tall you are. · A BMI lower than 18.5 is considered underweight. · A BMI between 18.5 and 24.9 is considered healthy. · A BMI between 25 and 29.9 is considered overweight. A BMI of 30 or higher is considered obese. If your BMI is in the normal range, it means that you have a lower risk for weight-related health problems. If your BMI is in the overweight or obese range, you may be at increased risk for weight-related health problems, such as high blood pressure, heart disease, stroke, arthritis or joint pain, and diabetes. If your BMI is in the underweight range, you may be at increased risk for health problems such as fatigue, lower protection (immunity) against illness, muscle loss, bone loss, hair loss, and hormone problems. BMI is just one measure of your risk for weight-related health problems. You may be at higher risk for health problems if you are not active, you eat an unhealthy diet, or you drink too much alcohol or use tobacco products. Follow-up care is a key part of your treatment and safety. Be sure to make and go to all appointments, and call your doctor if you are having problems. It's also a good idea to know your test results and keep a list of the medicines you take. How can you care for yourself at home? · Practice healthy eating habits. This includes eating plenty of fruits, vegetables, whole grains, lean protein, and low-fat dairy. · If your doctor recommends it, get more exercise. Walking is a good choice. Bit by bit, increase the amount you walk every day.  Try for at least 30 minutes on most days of the week. · Do not smoke. Smoking can increase your risk for health problems. If you need help quitting, talk to your doctor about stop-smoking programs and medicines. These can increase your chances of quitting for good. · Limit alcohol to 2 drinks a day for men and 1 drink a day for women. Too much alcohol can cause health problems. If you have a BMI higher than 25 · Your doctor may do other tests to check your risk for weight-related health problems. This may include measuring the distance around your waist. A waist measurement of more than 40 inches in men or 35 inches in women can increase the risk of weight-related health problems. · Talk with your doctor about steps you can take to stay healthy or improve your health. You may need to make lifestyle changes to lose weight and stay healthy, such as changing your diet and getting regular exercise. If you have a BMI lower than 18.5 · Your doctor may do other tests to check your risk for health problems. · Talk with your doctor about steps you can take to stay healthy or improve your health. You may need to make lifestyle changes to gain or maintain weight and stay healthy, such as getting more healthy foods in your diet and doing exercises to build muscle. Where can you learn more? Go to http://freddie-derek.info/. Enter S176 in the search box to learn more about \"Body Mass Index: Care Instructions. \" Current as of: January 23, 2017 Content Version: 11.3 © 0631-7368 "Internet America, Inc.", Incorporated. Care instructions adapted under license by [employer] (which disclaims liability or warranty for this information). If you have questions about a medical condition or this instruction, always ask your healthcare professional. [place] [address] any warranty or liability for your use of this information. Introducing [address] Willadean Saint introduces [employer] patient portal. Now you can access parts of your medical record, email your doctor's office, and request medication refills online. 1. In your internet browser, go to [email or web address] 2. Click on the First Time User? Click Here link in the Sign In box. You will see the New Member Sign Up page. 3. Enter your [employer] Access Code exactly as it appears below. You will not need to use this code after youve completed the sign-up process. If you do not sign up before the expiration date, you must request a new code. · [employer] Access Code: [email or web address] Expires: 10/29/2017  9:58 AM 
 
4. Enter the last four digits of your Social Security Number (xxxx) and Date of Birth (mm/dd/yyyy) as indicated and click Submit. You will be taken to the next sign-up page. 5. Create a [employer] ID. This will be your [employer] login ID and cannot be changed, so think of one that is secure and easy to remember. 6. Create a [employer] password. You can change your password at any time. 7. Enter your Password Reset Question and Answer. This can be used at a later time if you forget your password. 8. Enter your e-mail address. You will receive e-mail notification when new information is available in [address] 9. Click Sign Up. You can now view and download portions of your medical record. 10. Click the Download Summary menu link to download a portable copy of your medical information. If you have questions, please visit the Frequently Asked Questions section of the [employer] website. Remember, [employer] is NOT to be used for urgent needs. For medical emergencies, dial 911. Now available from your iPhone and Android! Please provide this summary of care documentation to your next provider. Your primary care clinician is listed as Wiliam Smith. If you have any questions after today's visit, please call [phone].

## 2017-09-29 NOTE — PROGRESS NOTES
Subjective: Darío Curtis  is a 57 y.o. female who presents for follow-up about 1 month following laparoscopic sleeve gastrectomy. She has lost a total of 26 pounds since surgery. Body mass index is 37.42 kg/(m^2). Manda Landon Pain assessment - 0/10    Surgery related complication: NA       She reports no real issues and denies vomiting, abdominal pain, diarrhea and difficulty breathing. The patient's exercise level: not active. Changes in her medical history and medications have been reviewed. Patient Active Problem List   Diagnosis Code    Hypercholesterolemia E78.00    Chronic back pain M54.9, G89.29    Snores R06.83    Morbid obesity (HCC) E66.01    GERD (gastroesophageal reflux disease) K21.9    Arthritic-like pain M25.50    BMI 38.0-38.9,adult Z68.38    S/P gastric surgery Z98.890    Intestinal malabsorption K90.9     Past Medical History:   Diagnosis Date    Arthritic-like pain     Chronic back pain     Chronic pain     back pain    GERD (gastroesophageal reflux disease)     Hypercholesterolemia     Morbid obesity ([facility] [address]     Morbid obesity with body mass index of 40.0-49.9 (HCC)     Snreji     has not perfomed a sleep study     Past Surgical History:   Procedure Laterality Date    HX LIPOSUCTION      HX TOTAL ABDOMINAL HYSTERECTOMY         Objective:     Visit Vitals    /69 (BP 1 Location: Left arm, BP Patient Position: Sitting)    Pulse 66    Resp 16    Ht 5' 4\" (1.626 m)    Wt 98.9 kg (218 lb)    SpO2 100%    BMI 37.42 kg/m2        Physical Exam:    General:  alert, cooperative, no distress, appears stated age   [de-identified]: clear to auscultation bilaterally   Heart:  Regular rate and rhythm   Abdomen:   abdomen is soft without significant tenderness, masses, organomegaly or guarding; Incisions: healing well, no significant drainage       Assessment:     1. History of Morbid obesity, status post  laparoscopic sleeve gastrectomy. Doing well; no concerns. .     Plan: 1. Remember to measure portions, continue low carbohydrate diet  2. Advance diet to solid phase  3. Remember vitamin supplements. 4. Add weight resistance to exercise. 5. Attend support group  6. Follow-up in 1 month(s).   7. The patient understands the plan of action

## 2017-10-17 ENCOUNTER — TELEPHONE (OUTPATIENT)
Dept: SURGERY | Age: 62
End: 2017-10-17

## 2017-10-27 ENCOUNTER — OFFICE VISIT (OUTPATIENT)
Dept: SURGERY | Age: 62
End: 2017-10-27

## 2017-10-27 VITALS
SYSTOLIC BLOOD PRESSURE: 116 MMHG | HEIGHT: 64 IN | WEIGHT: 207 LBS | OXYGEN SATURATION: 100 % | DIASTOLIC BLOOD PRESSURE: 72 MMHG | BODY MASS INDEX: 35.34 KG/M2 | HEART RATE: 67 BPM

## 2017-10-27 DIAGNOSIS — K90.9 INTESTINAL MALABSORPTION, UNSPECIFIED TYPE: Primary | ICD-10-CM

## 2017-10-27 DIAGNOSIS — Z98.890 S/P GASTRIC SURGERY: ICD-10-CM

## 2017-10-27 NOTE — PATIENT INSTRUCTIONS
Body Mass Index: Care Instructions  Your Care Instructions    Body mass index (BMI) can help you see if your weight is raising your risk for health problems. It uses a formula to compare how much you weigh with how tall you are. · A BMI lower than 18.5 is considered underweight. · A BMI between 18.5 and 24.9 is considered healthy. · A BMI between 25 and 29.9 is considered overweight. A BMI of 30 or higher is considered obese. If your BMI is in the normal range, it means that you have a lower risk for weight-related health problems. If your BMI is in the overweight or obese range, you may be at increased risk for weight-related health problems, such as high blood pressure, heart disease, stroke, arthritis or joint pain, and diabetes. If your BMI is in the underweight range, you may be at increased risk for health problems such as fatigue, lower protection (immunity) against illness, muscle loss, bone loss, hair loss, and hormone problems. BMI is just one measure of your risk for weight-related health problems. You may be at higher risk for health problems if you are not active, you eat an unhealthy diet, or you drink too much alcohol or use tobacco products. Follow-up care is a key part of your treatment and safety. Be sure to make and go to all appointments, and call your doctor if you are having problems. It's also a good idea to know your test results and keep a list of the medicines you take. How can you care for yourself at home? · Practice healthy eating habits. This includes eating plenty of fruits, vegetables, whole grains, lean protein, and low-fat dairy. · If your doctor recommends it, get more exercise. Walking is a good choice. Bit by bit, increase the amount you walk every day. Try for at least 30 minutes on most days of the week. · Do not smoke. Smoking can increase your risk for health problems. If you need help quitting, talk to your doctor about stop-smoking programs and medicines. These can increase your chances of quitting for good. · Limit alcohol to 2 drinks a day for men and 1 drink a day for women. Too much alcohol can cause health problems. If you have a BMI higher than 25  · Your doctor may do other tests to check your risk for weight-related health problems. This may include measuring the distance around your waist. A waist measurement of more than 40 inches in men or 35 inches in women can increase the risk of weight-related health problems. · Talk with your doctor about steps you can take to stay healthy or improve your health. You may need to make lifestyle changes to lose weight and stay healthy, such as changing your diet and getting regular exercise. If you have a BMI lower than 18.5  · Your doctor may do other tests to check your risk for health problems. · Talk with your doctor about steps you can take to stay healthy or improve your health. You may need to make lifestyle changes to gain or maintain weight and stay healthy, such as getting more healthy foods in your diet and doing exercises to build muscle. Where can you learn more? Go to http://freddie-derek.info/. Enter S176 in the search box to learn more about \"Body Mass Index: Care Instructions. \"  Current as of: October 13, 2016  Content Version: 11.4  © 1524-7766 Healthwise, Incorporated. Care instructions adapted under license by [employer] (which disclaims liability or warranty for this information). If you have questions about a medical condition or this instruction, always ask your healthcare professional. [place] [address] any warranty or liability for your use of this information. Patient Instructions      1. Continue to monitor carbohydrate and protein intake- remember to keep your           total  carbohydrates to 50 grams or less per day for best results.   2. Remember hydration goals - usually 48 to 64 ounces of liquids per day  3. Continue to work towards exercise goals - minimum 3 days per week of 45          minutes to  1 hour at a time. 4. Remember to take vitamins as directed        Supplement Resource Guide    Importance of Protein:   Maintains lean body mass, produces antibodies to fight off infections, heals wounds, minimizes hair loss, helps to give you energy, helps with satiety, and keeping you full between meals. Importance of Calcium:  Needed for healthy bones and teeth, normal blood clotting, and nervous system functioning, higher risk of osteoporosis and bone disease with non-compliance. Importance of Multivitamins: Many functions. Supply you with extra nutrients that you may be missing from food. May lead to iron deficiency anemia, weakness, fatigue, and many other symptoms with non-compliance. Importance of B Vitamins:  Important for red blood cell formation, metabolism, energy, and helps to maintain a healthy nervous system. Protein Supplement  Find one you like now. Use immediately after surgery. Look for:  35-50g protein each day from your protein supplement once you reach the progression diet. 0-3 g fat per serving  0-3 g sugar per serving    Protein drinks should be split in separate dosages. Recommend: Lifelong  1 year + Calcium Supplement:     Start taking within a month after surgery. Look for: Calcium Citrate Plus D (1500 mg per day)  Recommend: Citracal     .          Avoid chocolate chewable calcium. Can use chewable bariatric or [employer] brand or similar chewable. The body cannot absorb more than 500-600 mg @ a time. Take for Life Multi-vitamin Supplement:      1st Month After Surgery: Any complete chewable, such as: Lenoxs Complete chewables. Avoid Lenox sours or gummies. They lack iron and other important nutrients and also have added sugar.     Continue with chewable vitamin or change to adult complete multivitamin one month after surgery. Menstruating women can take a prenatal vitamin. Make sure has at least 18 mg iron and 297-616 mcg folic acid):    Vitamin B12, B Complex Vitamin, and Biotin  Start taking within a month after surgery. Vitamin B12:  1000 mcg of Vitamin B12 three times weekly    Must take sublingually (meaning you take it under your tongue) or in a liquid drop form for easy absorption. B Complex Vitamin: Take a pill or liquid drop form once daily. Biotin: This vitamin can help prevent hair loss.     Recommend 5mg   (5000 mcg) a day  Biotin is Optional

## 2017-10-27 NOTE — MR AVS SNAPSHOT
Visit Information Date & Time Provider Department Dept. Phone Encounter #  
 10/27/2017  9:30 AM Janay Talavera  [facility] [phone] [id number] Follow-up Instructions Return in about 2 months (around 12/27/2017). Follow-up and Disposition History Your Appointments 1/12/2018  9:15 AM  
Office Visit with Janay Talavera MD  
3 [facility]: 4 mo po  
 [address] [place] [address]  
  
   
 [address] [place] [address] Upcoming Health Maintenance Date Due Hepatitis C Screening 1955 DTaP/Tdap/Td series (1 - Tdap) 8/23/1974 PAP AKA CERVICAL CYTOLOGY 8/23/1974 BREAST CANCER SCRN MAMMOGRAM 8/23/2003 FOBT Q 1 YEAR AGE 50-75 8/23/2003 ZOSTER VACCINE AGE 60> 6/23/2013 INFLUENZA AGE 9 TO ADULT 8/1/2017 Allergies as of 10/27/2017  Review Complete On: 10/27/2017 By: Janay Talavera MD  
 No Known Allergies Current Immunizations  Never Reviewed No immunizations on file. Not reviewed this visit You Were Diagnosed With   
  
 Codes Comments Intestinal malabsorption, unspecified type    -  Primary ICD-10-CM: K90.9 ICD-9-CM: 579.9 S/P gastric surgery     ICD-10-CM: N88.745 ICD-9-CM: V45.89 Vitals BP Pulse Height(growth percentile) Weight(growth percentile) SpO2 BMI  
 116/72 (BP 1 Location: Left arm, BP Patient Position: Sitting) 67 5' 4\" (1.626 m) 207 lb (93.9 kg) 100% 35.53 kg/m2 OB Status Smoking Status Hysterectomy Never Smoker BMI and BSA Data Body Mass Index Body Surface Area 35.53 kg/m 2 2.06 m 2 Your Updated Medication List  
  
   
This list is accurate as of: 10/27/17 10:12 AM.  Always use your most recent med list.  
  
  
  
  
 multivitamin tablet Commonly known as:  ONE A DAY  
 Take 1 Tab by mouth daily. Follow-up Instructions Return in about 2 months (around 12/27/2017). Patient Instructions Body Mass Index: Care Instructions Your Care Instructions Body mass index (BMI) can help you see if your weight is raising your risk for health problems. It uses a formula to compare how much you weigh with how tall you are. · A BMI lower than 18.5 is considered underweight. · A BMI between 18.5 and 24.9 is considered healthy. · A BMI between 25 and 29.9 is considered overweight. A BMI of 30 or higher is considered obese. If your BMI is in the normal range, it means that you have a lower risk for weight-related health problems. If your BMI is in the overweight or obese range, you may be at increased risk for weight-related health problems, such as high blood pressure, heart disease, stroke, arthritis or joint pain, and diabetes. If your BMI is in the underweight range, you may be at increased risk for health problems such as fatigue, lower protection (immunity) against illness, muscle loss, bone loss, hair loss, and hormone problems. BMI is just one measure of your risk for weight-related health problems. You may be at higher risk for health problems if you are not active, you eat an unhealthy diet, or you drink too much alcohol or use tobacco products. Follow-up care is a key part of your treatment and safety. Be sure to make and go to all appointments, and call your doctor if you are having problems. It's also a good idea to know your test results and keep a list of the medicines you take. How can you care for yourself at home? · Practice healthy eating habits. This includes eating plenty of fruits, vegetables, whole grains, lean protein, and low-fat dairy. · If your doctor recommends it, get more exercise. Walking is a good choice. Bit by bit, increase the amount you walk every day. Try for at least 30 minutes on most days of the week. · Do not smoke. Smoking can increase your risk for health problems. If you need help quitting, talk to your doctor about stop-smoking programs and medicines. These can increase your chances of quitting for good. · Limit alcohol to 2 drinks a day for men and 1 drink a day for women. Too much alcohol can cause health problems. If you have a BMI higher than 25 · Your doctor may do other tests to check your risk for weight-related health problems. This may include measuring the distance around your waist. A waist measurement of more than 40 inches in men or 35 inches in women can increase the risk of weight-related health problems. · Talk with your doctor about steps you can take to stay healthy or improve your health. You may need to make lifestyle changes to lose weight and stay healthy, such as changing your diet and getting regular exercise. If you have a BMI lower than 18.5 · Your doctor may do other tests to check your risk for health problems. · Talk with your doctor about steps you can take to stay healthy or improve your health. You may need to make lifestyle changes to gain or maintain weight and stay healthy, such as getting more healthy foods in your diet and doing exercises to build muscle. Where can you learn more? Go to http://freddie-derek.info/. Enter S176 in the search box to learn more about \"Body Mass Index: Care Instructions. \" Current as of: October 13, 2016 Content Version: 11.4 © 5436-3576 Healthwise, Incorporated. Care instructions adapted under license by [employer] (which disclaims liability or warranty for this information). If you have questions about a medical condition or this instruction, always ask your healthcare professional. [place] [address] any warranty or liability for your use of this information. Patient Instructions 1. Continue to monitor carbohydrate and protein intake- remember to keep your           total  carbohydrates to 50 grams or less per day for best results. 2. Remember hydration goals - usually 48 to 64 ounces of liquids per day 3. Continue to work towards exercise goals - minimum 3 days per week of 45          minutes to  1 hour at a time. 4. Remember to take vitamins as directed Supplement Resource Guide Importance of Protein:  
Maintains lean body mass, produces antibodies to fight off infections, heals wounds, minimizes hair loss, helps to give you energy, helps with satiety, and keeping you full between meals. Importance of Calcium: 
Needed for healthy bones and teeth, normal blood clotting, and nervous system functioning, higher risk of osteoporosis and bone disease with non-compliance. Importance of Multivitamins: Many functions. Supply you with extra nutrients that you may be missing from food. May lead to iron deficiency anemia, weakness, fatigue, and many other symptoms with non-compliance. Importance of B Vitamins: 
Important for red blood cell formation, metabolism, energy, and helps to maintain a healthy nervous system. Protein Supplement Find one you like now. Use immediately after surgery. Look for: 
35-50g protein each day from your protein supplement once you reach the progression diet. 0-3 g fat per serving 0-3 g sugar per serving Protein drinks should be split in separate dosages. Recommend: Lifelong 1 year + Calcium Supplement:  
 
Start taking within a month after surgery. Look for: Calcium Citrate Plus D (1500 mg per day) Recommend: Citracal 
 
 . Avoid chocolate chewable calcium. Can use chewable bariatric or GNC brand or similar chewable. The body cannot absorb more than 500-600 mg @ a time. Take for Life Multi-vitamin Supplement:   
 
1st Month After Surgery: Any complete chewable, such as: [place]s Complete chewables. Avoid [place] sours or gummies. They lack iron and other important nutrients and also have added sugar. Continue with chewable vitamin or change to adult complete multivitamin one month after surgery. Menstruating women can take a prenatal vitamin. Make sure has at least 18 mg iron and 017-284 mcg folic acid): Vitamin B12, B Complex Vitamin, and Biotin Start taking within a month after surgery. Vitamin B12:  1000 mcg of Vitamin B12 three times weekly Must take sublingually (meaning you take it under your tongue) or in a liquid drop form for easy absorption. B Complex Vitamin: Take a pill or liquid drop form once daily. Biotin: This vitamin can help prevent hair loss. Recommend 5mg  
(5000 mcg) a day Biotin is Optional  
 
 
 
 
 
 Patient Instructions History Introducing [facility]! Bessie Suresh introduces [facility] patient portal. Now you can access parts of your medical record, email your doctor's office, and request medication refills online. 1. In your internet browser, go to [email or web address] 2. Click on the First Time User? Click Here link in the Sign In box. You will see the New Member Sign Up page. 3. Enter your [facility] Access Code exactly as it appears below. You will not need to use this code after youve completed the sign-up process. If you do not sign up before the expiration date, you must request a new code. · [facility] Access Code: [email or web address] Expires: 10/29/2017  9:58 AM 
 
4. Enter the last four digits of your Social Security Number (xxxx) and Date of Birth (mm/dd/yyyy) as indicated and click Submit. You will be taken to the next sign-up page. 5. Create a LessonFacet ID. This will be your [facility] login ID and cannot be changed, so think of one that is secure and easy to remember. 6. Create a [facility] password. You can change your password at any time. 7. Enter your Password Reset Question and Answer.  This can be used at a later time if you forget your password. 8. Enter your e-mail address. You will receive e-mail notification when new information is available in [address] 9. Click Sign Up. You can now view and download portions of your medical record. 10. Click the Download Summary menu link to download a portable copy of your medical information. If you have questions, please visit the Frequently Asked Questions section of the [employer] website. Remember, [employer] is NOT to be used for urgent needs. For medical emergencies, dial 911. Now available from your iPhone and Android! Please provide this summary of care documentation to your next provider. Your primary care clinician is listed as Adia Bey. If you have any questions after today's visit, please call [phone].

## 2017-11-03 ENCOUNTER — TELEPHONE (OUTPATIENT)
Dept: SURGERY | Age: 62
End: 2017-11-03

## 2018-01-10 ENCOUNTER — OFFICE VISIT (OUTPATIENT)
Dept: SURGERY | Age: 63
End: 2018-01-10

## 2018-01-10 VITALS
HEIGHT: 64 IN | DIASTOLIC BLOOD PRESSURE: 90 MMHG | SYSTOLIC BLOOD PRESSURE: 112 MMHG | WEIGHT: 181 LBS | BODY MASS INDEX: 30.9 KG/M2 | HEART RATE: 83 BPM | OXYGEN SATURATION: 100 %

## 2018-01-10 DIAGNOSIS — Z98.890 S/P GASTRIC SURGERY: ICD-10-CM

## 2018-01-10 DIAGNOSIS — K90.9 INTESTINAL MALABSORPTION, UNSPECIFIED TYPE: Primary | ICD-10-CM

## 2018-01-10 NOTE — PATIENT INSTRUCTIONS
Patient Instructions      1. Remember hydration goals - minimum of 64 ounces of liquids per day (dehydration is the number one reason for hospital readmission). 2. Continue to monitor carbohydrate and protein intake you need a minimum of  Grams of protein daily- remember to keep your total carbohydrates to 50 grams or less per day for best results. 3. Continue to work towards exercise goals - 60-90 minutes, 5 times a week minimum of deliberate, aerobic exercise is the ultimate goal with strength training 2 times each week. Refer to [employer] for  information. 4. Remember to take vitamins as directed in your handbook. 5. Attend support group the 2nd Thursday of each month. 6. Use Miralax if you become constipated. 7. Call us at [phone] or email us through [email or web address]" with questions,     concerns or worsening of condition, we have someone on call 24 hours a day. If you are unable to reach our office, you are to go to your Primary Care Physician or the Emergency Department. Supplement Resource Guide    Importance of Protein:   Maintains lean body mass, produces antibodies to fight off infections, heals wounds, minimizes hair loss, helps to give you energy, helps with satiety, and keeping you full between meals. Importance of Calcium:  Needed for healthy bones and teeth, normal blood clotting, and nervous system functioning, higher risk of osteoporosis and bone disease with non-compliance. Importance of Multivitamins: Many functions. Supply you with extra nutrients that you may be missing from food. May lead to iron deficiency anemia, weakness, fatigue, and many other symptoms with non-compliance. Importance of B Vitamins:  Important for red blood cell formation, metabolism, energy, and helps to maintain a healthy nervous system. Protein Supplement  Find one you like now. Use immediately after surgery. Look for:  35-50g protein each day from your protein supplement once you reach the progression diet. 0-3 g fat per serving  0-3 g sugar per serving    Protein drinks should be split in separate dosages. Recommend: Lifelong  1 year + Calcium Supplement:     Start taking within a month after surgery. Look for: Calcium Citrate Plus D (1500 mg per day)  Recommend: Citracal     .            Avoid chocolate chewable calcium. Can use chewable bariatric or GNC brand or similar chewable. The body cannot absorb more than 500-600 mg of calcium at a time. Take for Life Multi-vitamin Supplement:      Start immediately after surgery: any complete chewable, such as: [place]s Complete chewables. Avoid [place] sours or gummies. They lack iron and other important nutrients and also have added sugar. Continue with chewable vitamin or change to adult complete multivitamin one month after surgery. Menstruating women can take a prenatal vitamin. Make sure has at least 18 mg iron and 981-026 mcg folic acid   Vitamin C39, B Complex Vitamin, and Biotin  Start taking within a month after surgery. Vitamin B12:  1000 mcg of Vitamin B12 three times weekly    Must take sublingually (meaning you take it under your tongue) or in a liquid drop form for easy absorption. B Complex Vitamin: Take a pill or liquid drop form once daily. Biotin: This vitamin can help prevent hair loss. Recommend 5mg   (5000 mcg) a day  Biotin is Optional              Body Mass Index: Care Instructions  Your Care Instructions    Body mass index (BMI) can help you see if your weight is raising your risk for health problems. It uses a formula to compare how much you weigh with how tall you are. · A BMI lower than 18.5 is considered underweight. · A BMI between 18.5 and 24.9 is considered healthy. · A BMI between 25 and 29.9 is considered overweight. A BMI of 30 or higher is considered obese.   If your BMI is in the normal range, it means that you have a lower risk for weight-related health problems. If your BMI is in the overweight or obese range, you may be at increased risk for weight-related health problems, such as high blood pressure, heart disease, stroke, arthritis or joint pain, and diabetes. If your BMI is in the underweight range, you may be at increased risk for health problems such as fatigue, lower protection (immunity) against illness, muscle loss, bone loss, hair loss, and hormone problems. BMI is just one measure of your risk for weight-related health problems. You may be at higher risk for health problems if you are not active, you eat an unhealthy diet, or you drink too much alcohol or use tobacco products. Follow-up care is a key part of your treatment and safety. Be sure to make and go to all appointments, and call your doctor if you are having problems. It's also a good idea to know your test results and keep a list of the medicines you take. How can you care for yourself at home? · Practice healthy eating habits. This includes eating plenty of fruits, vegetables, whole grains, lean protein, and low-fat dairy. · If your doctor recommends it, get more exercise. Walking is a good choice. Bit by bit, increase the amount you walk every day. Try for at least 30 minutes on most days of the week. · Do not smoke. Smoking can increase your risk for health problems. If you need help quitting, talk to your doctor about stop-smoking programs and medicines. These can increase your chances of quitting for good. · Limit alcohol to 2 drinks a day for men and 1 drink a day for women. Too much alcohol can cause health problems. If you have a BMI higher than 25  · Your doctor may do other tests to check your risk for weight-related health problems.  This may include measuring the distance around your waist. A waist measurement of more than 40 inches in men or 35 inches in women can increase the risk of weight-related health problems. · Talk with your doctor about steps you can take to stay healthy or improve your health. You may need to make lifestyle changes to lose weight and stay healthy, such as changing your diet and getting regular exercise. If you have a BMI lower than 18.5  · Your doctor may do other tests to check your risk for health problems. · Talk with your doctor about steps you can take to stay healthy or improve your health. You may need to make lifestyle changes to gain or maintain weight and stay healthy, such as getting more healthy foods in your diet and doing exercises to build muscle. Where can you learn more? Go to http://freddie-derek.info/. Enter S176 in the search box to learn more about \"Body Mass Index: Care Instructions. \"  Current as of: October 13, 2016  Content Version: 11.4  © 9542-7169 Healthwise, Incorporated. Care instructions adapted under license by [employer] (which disclaims liability or warranty for this information). If you have questions about a medical condition or this instruction, always ask your healthcare professional. [address] any warranty or liability for your use of this information.

## 2018-01-10 NOTE — PROGRESS NOTES
Subjective: Sebastian Chavez is a 57 y.o. female is now 4 months status post laparoscopic sleeve gastrectomy. Doing well overall. She has lost a total of 63 pounds since surgery. Body mass index is 31.07 kg/(m^2). Has lost 56% of EBW. Currently on a solid food diet without difficulty, reports no issues and denies vomiting and abdominal pain. Taking in 64oz water daily. Sources of protein include tuna, salmon, chicken. Has been walking for exercise and tries to get over 10,000 steps in a day. Patient has hyperpigmentation under her eyes that she is concerned about. She also states that she is craving ice and has mucus in her throat when she wakes up in the morning which she coughs up and then is fine the rest of the day. Bowel movements are constipated, is using colace. The patient is not having any pain. . The patient is not compliant with multivitamins, calcium, Vit D and B12 supplements.      Weight Loss Metrics 1/10/2018 10/27/2017 9/29/2017 9/29/2017 9/14/2017 8/30/2017 8/29/2017   Today's Wt 181 lb 207 lb 218 lb 218 lb 222 lb 12.8 oz 252 lb -   BMI 31.07 kg/m2 35.53 kg/m2 37.42 kg/m2 37.42 kg/m2 38.24 kg/m2 - 43.26 kg/m2          Comorbidities:    Hypertension: not applicable  Diabetes: not applicable  Obstructive Sleep Apnea: not applicable  Hyperlipidemia: not applicable  Stress Urinary Incontinence: not applicable  Gastroesophageal Reflux: not applicable  Weight related arthropathy:not applicable     Patient Active Problem List   Diagnosis Code    Hypercholesterolemia E78.00    Chronic back pain M54.9, G89.29    Snores R06.83    Morbid obesity ([facility] [address] E66.01    GERD (gastroesophageal reflux disease) K21.9    Arthritic-like pain M25.50    BMI 38.0-38.9,adult Z68.38    S/P gastric surgery Z98.890    Intestinal malabsorption K90.9        Past Medical History:   Diagnosis Date    Arthritic-like pain     Chronic back pain     Chronic pain     back pain    GERD (gastroesophageal reflux disease)     Hypercholesterolemia     Morbid obesity ([facility] [address]     Morbid obesity with body mass index of 40.0-49.9 ([facility])     reji     has not perfomed a sleep study       Past Surgical History:   Procedure Laterality Date    HX LIPOSUCTION      HX TOTAL ABDOMINAL HYSTERECTOMY             No Known Allergies    Review of Systems:  General - No history or complaints of unexpected fever or chills  Head/Neck - No history or complaints of headache or dizziness  Cardiac - No history or complaints of chest pain, palpitations, or shortness of breath  Pulmonary - No history or complaints of shortness of breath or productive cough  Gastrointestinal - as noted above  Genitourinary - No history or complaints of hematuria/dysuria or renal lithiasis  Musculoskeletal - No history or complaints of joint  muscular weakness  Hematologic - No history of any bleeding episodes  Neurologic - No history or complaints of  migraine headaches or neurologic symptoms    Objective:     Visit Vitals    /90 (BP 1 Location: Left arm, BP Patient Position: Sitting)    Pulse 83    Ht 5' 4\" (1.626 m)    Wt 82.1 kg (181 lb)    SpO2 100%    BMI 31.07 kg/m2       General:  alert, cooperative, no distress, appears stated age   Chest: lungs clear to auscultation, no rhonchi, rales or wheezes, no accessory muscle use   Cor:   Regular rate and rhythm or without murmur or extra heart sounds   Abdomen: soft, bowel sounds active, non-tender, no masses or organomegaly   Incisions:   healing well, no drainage, no erythema, no hernia, no seroma, no swelling, no dehiscence, incision well approximated       Assessment:   History of Morbid obesity, status post laparoscopic sleeve gastrectomy. Doing well postoperatively. Plan:     1. Increase activity to the goal of 30 minutes daily  2. Discussed patients weight loss goals and dietary choices in relation to goals.   3. Reminded to measure portions, continue high protein, low carbohydrate diet.  Reminded to eat regularly, to eat slowly & not to drink with meals. 4. Continue vitamin supplementation  5. Continue current medications and follow up with PCP for management of regimen. 6. Continue cardio exercise and add resistance exercises. 60-90 minutes of aerobic activity 5 days a week and strength training 2 days each week. 7. Encouraged to attend support group   8. Patient to complete labs before next visit. Lab slip given today. 9. I have discussed this plan with patient and they verbalized understanding  10. Follow up in 2 months or sooner if patient has questions, concerns or worsening of condition, if unable to reach our office, patient should report to the ED. 6. Ms. Richi Guzman has a reminder for a \"due or due soon\" health maintenance. I have asked that she contact her primary care provider for a follow-up on this health maintenance.

## 2018-01-10 NOTE — MR AVS SNAPSHOT
Visit Information Date & Time Provider Department Dept. Phone Encounter #  
 1/10/2018  2:30 PM Freddie Dudley, 82 [facility] [phone] [id number] Follow-up Instructions Return in about 2 months (around 3/10/2018). Follow-up and Disposition History Upcoming Health Maintenance Date Due Hepatitis C Screening 1955 DTaP/Tdap/Td series (1 - Tdap) 8/23/1974 PAP AKA CERVICAL CYTOLOGY 8/23/1974 BREAST CANCER SCRN MAMMOGRAM 8/23/2003 FOBT Q 1 YEAR AGE 50-75 8/23/2003 ZOSTER VACCINE AGE 60> 6/23/2013 Influenza Age 5 to Adult 8/1/2017 Allergies as of 1/10/2018  Review Complete On: 1/10/2018 By: NARINDER Green No Known Allergies Current Immunizations  Never Reviewed No immunizations on file. Not reviewed this visit You Were Diagnosed With   
  
 Codes Comments Intestinal malabsorption, unspecified type    -  Primary ICD-10-CM: K90.9 ICD-9-CM: 579.9 S/P gastric surgery     ICD-10-CM: Y05.648 ICD-9-CM: V45.89 Vitals BP Pulse Height(growth percentile) Weight(growth percentile) SpO2 BMI  
 112/90 (BP 1 Location: Left arm, BP Patient Position: Sitting) 83 5' 4\" (1.626 m) 181 lb (82.1 kg) 100% 31.07 kg/m2 OB Status Smoking Status Hysterectomy Never Smoker BMI and BSA Data Body Mass Index Body Surface Area 31.07 kg/m 2 1.93 m 2 Your Updated Medication List  
  
Notice  As of 1/10/2018  3:34 PM  
 You have not been prescribed any medications. Follow-up Instructions Return in about 2 months (around 3/10/2018). To-Do List   
 01/10/2018 Lab:  CBC WITH AUTOMATED DIFF   
  
 01/10/2018 Lab:  FERRITIN   
  
 01/10/2018 Lab:  IRON   
  
 01/10/2018 Lab:  METABOLIC PANEL, COMPREHENSIVE   
  
 01/10/2018 Lab:  VITAMIN B1, WHOLE BLOOD   
  
 01/10/2018 Lab:  VITAMIN B12 & FOLATE   
  
 01/10/2018 Lab: VITAMIN D, 25 HYDROXY Patient Instructions Patient Instructions 1. Remember hydration goals - minimum of 64 ounces of liquids per day (dehydration is the number one reason for hospital readmission). 2. Continue to monitor carbohydrate and protein intake you need a minimum of  Grams of protein daily- remember to keep your total carbohydrates to 50 grams or less per day for best results. 3. Continue to work towards exercise goals - 60-90 minutes, 5 times a week minimum of deliberate, aerobic exercise is the ultimate goal with strength training 2 times each week. Refer to [employer] for  information. 4. Remember to take vitamins as directed in your handbook. 5. Attend support group the 2nd Thursday of each month. 6. Use Miralax if you become constipated. 7. Call us at [phone] or email us through [email or web address]" with questions,     concerns or worsening of condition, we have someone on call 24 hours a day. If you are unable to reach our office, you are to go to your Primary Care Physician or the Emergency Department. Supplement Resource Guide Importance of Protein:  
Maintains lean body mass, produces antibodies to fight off infections, heals wounds, minimizes hair loss, helps to give you energy, helps with satiety, and keeping you full between meals. Importance of Calcium: 
Needed for healthy bones and teeth, normal blood clotting, and nervous system functioning, higher risk of osteoporosis and bone disease with non-compliance. Importance of Multivitamins: Many functions. Supply you with extra nutrients that you may be missing from food. May lead to iron deficiency anemia, weakness, fatigue, and many other symptoms with non-compliance. Importance of B Vitamins: 
Important for red blood cell formation, metabolism, energy, and helps to maintain a healthy nervous system. Protein Supplement Find one you like now. Use immediately after surgery. Look for: 
35-50g protein each day from your protein supplement once you reach the progression diet. 0-3 g fat per serving 0-3 g sugar per serving Protein drinks should be split in separate dosages. Recommend: Lifelong 1 year + Calcium Supplement:  
 
Start taking within a month after surgery. Look for: Calcium Citrate Plus D (1500 mg per day) Recommend: Citracal 
 
 . Avoid chocolate chewable calcium. Can use chewable bariatric or GNC brand or similar chewable. The body cannot absorb more than 500-600 mg of calcium at a time. Take for Life Multi-vitamin Supplement:   
 
Start immediately after surgery: any complete chewable, such as: Lincolns Complete chewables. Avoid Lincoln sours or gummies. They lack iron and other important nutrients and also have added sugar. Continue with chewable vitamin or change to adult complete multivitamin one month after surgery. Menstruating women can take a prenatal vitamin. Make sure has at least 18 mg iron and 477-741 mcg folic acid Vitamin B12, B Complex Vitamin, and Biotin Start taking within a month after surgery. Vitamin B12:  1000 mcg of Vitamin B12 three times weekly Must take sublingually (meaning you take it under your tongue) or in a liquid drop form for easy absorption. B Complex Vitamin: Take a pill or liquid drop form once daily. Biotin: This vitamin can help prevent hair loss. Recommend 5mg  
(5000 mcg) a day Biotin is Optional  
 
 
 
 
  
Body Mass Index: Care Instructions Your Care Instructions Body mass index (BMI) can help you see if your weight is raising your risk for health problems. It uses a formula to compare how much you weigh with how tall you are. · A BMI lower than 18.5 is considered underweight. · A BMI between 18.5 and 24.9 is considered healthy. · A BMI between 25 and 29.9 is considered overweight. A BMI of 30 or higher is considered obese. If your BMI is in the normal range, it means that you have a lower risk for weight-related health problems. If your BMI is in the overweight or obese range, you may be at increased risk for weight-related health problems, such as high blood pressure, heart disease, stroke, arthritis or joint pain, and diabetes. If your BMI is in the underweight range, you may be at increased risk for health problems such as fatigue, lower protection (immunity) against illness, muscle loss, bone loss, hair loss, and hormone problems. BMI is just one measure of your risk for weight-related health problems. You may be at higher risk for health problems if you are not active, you eat an unhealthy diet, or you drink too much alcohol or use tobacco products. Follow-up care is a key part of your treatment and safety. Be sure to make and go to all appointments, and call your doctor if you are having problems. It's also a good idea to know your test results and keep a list of the medicines you take. How can you care for yourself at home? · Practice healthy eating habits. This includes eating plenty of fruits, vegetables, whole grains, lean protein, and low-fat dairy. · If your doctor recommends it, get more exercise. Walking is a good choice. Bit by bit, increase the amount you walk every day. Try for at least 30 minutes on most days of the week. · Do not smoke. Smoking can increase your risk for health problems. If you need help quitting, talk to your doctor about stop-smoking programs and medicines. These can increase your chances of quitting for good. · Limit alcohol to 2 drinks a day for men and 1 drink a day for women. Too much alcohol can cause health problems. If you have a BMI higher than 25 · Your doctor may do other tests to check your risk for weight-related health problems.  This may include measuring the distance around your waist. A waist measurement of more than 40 inches in men or 35 inches in women can increase the risk of weight-related health problems. · Talk with your doctor about steps you can take to stay healthy or improve your health. You may need to make lifestyle changes to lose weight and stay healthy, such as changing your diet and getting regular exercise. If you have a BMI lower than 18.5 · Your doctor may do other tests to check your risk for health problems. · Talk with your doctor about steps you can take to stay healthy or improve your health. You may need to make lifestyle changes to gain or maintain weight and stay healthy, such as getting more healthy foods in your diet and doing exercises to build muscle. Where can you learn more? Go to http://freddie-derek.info/. Enter S176 in the search box to learn more about \"Body Mass Index: Care Instructions. \" Current as of: October 13, 2016 Content Version: 11.4 © 3426-7626 [employer]. Care instructions adapted under license by [employer] (which disclaims liability or warranty for this information). If you have questions about a medical condition or this instruction, always ask your healthcare professional. [place] [address] any warranty or liability for your use of this information. Patient Instructions History Introducing [facility]! Pietro Hunt introduces [employer] patient portal. Now you can access parts of your medical record, email your doctor's office, and request medication refills online. 1. In your internet browser, go to [email or web address] 2. Click on the First Time User? Click Here link in the Sign In box. You will see the New Member Sign Up page. 3. Enter your [employer] Access Code exactly as it appears below. You will not need to use this code after youve completed the sign-up process.  If you do not sign up before the expiration date, you must request a new code. · Splice Access Code: [email or web address] Expires: 4/10/2018  3:34 PM 
 
4. Enter the last four digits of your Social Security Number (xxxx) and Date of Birth (mm/dd/yyyy) as indicated and click Submit. You will be taken to the next sign-up page. 5. Create a Splice ID. This will be your Splice login ID and cannot be changed, so think of one that is secure and easy to remember. 6. Create a Splice password. You can change your password at any time. 7. Enter your Password Reset Question and Answer. This can be used at a later time if you forget your password. 8. Enter your e-mail address. You will receive e-mail notification when new information is available in [address]. 9. Click Sign Up. You can now view and download portions of your medical record. 10. Click the Download Summary menu link to download a portable copy of your medical information. If you have questions, please visit the Frequently Asked Questions section of the Splice website. Remember, Splice is NOT to be used for urgent needs. For medical emergencies, dial 911. Now available from your iPhone and Android! Please provide this summary of care documentation to your next provider. Your primary care clinician is listed as Jarrell Dao. If you have any questions after today's visit, please call [phone].

## 2018-03-14 ENCOUNTER — HOSPITAL ENCOUNTER (OUTPATIENT)
Dept: LAB | Age: 63
Discharge: HOME | End: 2018-03-14
Payer: COMMERCIAL

## 2018-03-14 ENCOUNTER — OFFICE VISIT (OUTPATIENT)
Dept: SURGERY | Age: 63
End: 2018-03-14

## 2018-03-14 VITALS
RESPIRATION RATE: 16 BRPM | HEART RATE: 55 BPM | WEIGHT: 165 LBS | BODY MASS INDEX: 28.17 KG/M2 | HEIGHT: 64 IN | SYSTOLIC BLOOD PRESSURE: 109 MMHG | DIASTOLIC BLOOD PRESSURE: 72 MMHG | OXYGEN SATURATION: 100 %

## 2018-03-14 DIAGNOSIS — Z98.890 S/P GASTRIC SURGERY: ICD-10-CM

## 2018-03-14 DIAGNOSIS — K90.9 INTESTINAL MALABSORPTION, UNSPECIFIED TYPE: ICD-10-CM

## 2018-03-14 DIAGNOSIS — E78.00 HYPERCHOLESTEROLEMIA: ICD-10-CM

## 2018-03-14 DIAGNOSIS — K90.9 INTESTINAL MALABSORPTION, UNSPECIFIED TYPE: Primary | ICD-10-CM

## 2018-03-14 DIAGNOSIS — K90.89 OTHER SPECIFIED INTESTINAL MALABSORPTION: ICD-10-CM

## 2018-03-14 DIAGNOSIS — K90.89 OTHER SPECIFIED INTESTINAL MALABSORPTION: Primary | ICD-10-CM

## 2018-03-14 LAB
25(OH)D3 SERPL-MCNC: 6.3 NG/ML (ref 30–100)
ALBUMIN SERPL-MCNC: 3.8 G/DL (ref 3.4–5)
ALBUMIN/GLOB SERPL: 1.3 {RATIO} (ref 0.8–1.7)
ALP SERPL-CCNC: 84 U/L (ref 45–117)
ALT SERPL-CCNC: 13 U/L (ref 13–56)
ANION GAP SERPL CALC-SCNC: 8 MMOL/L (ref 3–18)
APPEARANCE UR: CLEAR
AST SERPL-CCNC: 10 U/L (ref 15–37)
BACTERIA URNS QL MICRO: ABNORMAL /HPF
BASOPHILS # BLD: 0 K/UL (ref 0–0.06)
BASOPHILS NFR BLD: 0 % (ref 0–2)
BILIRUB SERPL-MCNC: 0.4 MG/DL (ref 0.2–1)
BILIRUB UR QL: NEGATIVE
BUN SERPL-MCNC: 13 MG/DL (ref 7–18)
BUN/CREAT SERPL: 20 (ref 12–20)
CALCIUM SERPL-MCNC: 9.4 MG/DL (ref 8.5–10.1)
CHLORIDE SERPL-SCNC: 106 MMOL/L (ref 100–108)
CO2 SERPL-SCNC: 31 MMOL/L (ref 21–32)
COLOR UR: ABNORMAL
CREAT SERPL-MCNC: 0.64 MG/DL (ref 0.6–1.3)
DIFFERENTIAL METHOD BLD: NORMAL
EOSINOPHIL # BLD: 0.2 K/UL (ref 0–0.4)
EOSINOPHIL NFR BLD: 3 % (ref 0–5)
EPITH CASTS URNS QL MICRO: ABNORMAL /LPF (ref 0–5)
ERYTHROCYTE [DISTWIDTH] IN BLOOD BY AUTOMATED COUNT: 13.9 % (ref 11.6–14.5)
FERRITIN SERPL-MCNC: 133 NG/ML (ref 8–388)
FOLATE SERPL-MCNC: 7 NG/ML (ref 3.1–17.5)
GLOBULIN SER CALC-MCNC: 3 G/DL (ref 2–4)
GLUCOSE SERPL-MCNC: 84 MG/DL (ref 74–99)
GLUCOSE UR STRIP.AUTO-MCNC: NEGATIVE MG/DL
HCT VFR BLD AUTO: 37.7 % (ref 35–45)
HGB BLD-MCNC: 12.1 G/DL (ref 12–16)
HGB UR QL STRIP: NEGATIVE
IRON SERPL-MCNC: 75 UG/DL (ref 50–175)
KETONES UR QL STRIP.AUTO: ABNORMAL MG/DL
LEUKOCYTE ESTERASE UR QL STRIP.AUTO: NEGATIVE
LYMPHOCYTES # BLD: 2.1 K/UL (ref 0.9–3.6)
LYMPHOCYTES NFR BLD: 40 % (ref 21–52)
MCH RBC QN AUTO: 27.1 PG (ref 24–34)
MCHC RBC AUTO-ENTMCNC: 32.1 G/DL (ref 31–37)
MCV RBC AUTO: 84.5 FL (ref 74–97)
MONOCYTES # BLD: 0.4 K/UL (ref 0.05–1.2)
MONOCYTES NFR BLD: 7 % (ref 3–10)
MUCOUS THREADS URNS QL MICRO: ABNORMAL /LPF
NEUTS SEG # BLD: 2.7 K/UL (ref 1.8–8)
NEUTS SEG NFR BLD: 50 % (ref 40–73)
NITRITE UR QL STRIP.AUTO: NEGATIVE
PH UR STRIP: 6 [PH] (ref 5–8)
PLATELET # BLD AUTO: 317 K/UL (ref 135–420)
PMV BLD AUTO: 9.8 FL (ref 9.2–11.8)
POTASSIUM SERPL-SCNC: 4.7 MMOL/L (ref 3.5–5.5)
PROT SERPL-MCNC: 6.8 G/DL (ref 6.4–8.2)
PROT UR STRIP-MCNC: NEGATIVE MG/DL
RBC # BLD AUTO: 4.46 M/UL (ref 4.2–5.3)
RBC #/AREA URNS HPF: ABNORMAL /HPF (ref 0–5)
SODIUM SERPL-SCNC: 145 MMOL/L (ref 136–145)
SP GR UR REFRACTOMETRY: 1.03 (ref 1–1.03)
UROBILINOGEN UR QL STRIP.AUTO: 1 EU/DL (ref 0.2–1)
VIT B12 SERPL-MCNC: 303 PG/ML (ref 211–911)
WBC # BLD AUTO: 5.4 K/UL (ref 4.6–13.2)
WBC URNS QL MICRO: ABNORMAL /HPF (ref 0–5)

## 2018-03-14 PROCEDURE — 81001 URINALYSIS AUTO W/SCOPE: CPT | Performed by: SPECIALIST

## 2018-03-14 PROCEDURE — 36415 COLL VENOUS BLD VENIPUNCTURE: CPT | Performed by: SPECIALIST

## 2018-03-14 PROCEDURE — 83540 ASSAY OF IRON: CPT | Performed by: SPECIALIST

## 2018-03-14 PROCEDURE — 85025 COMPLETE CBC W/AUTO DIFF WBC: CPT | Performed by: SPECIALIST

## 2018-03-14 PROCEDURE — 84425 ASSAY OF VITAMIN B-1: CPT | Performed by: SPECIALIST

## 2018-03-14 PROCEDURE — 82607 VITAMIN B-12: CPT | Performed by: SPECIALIST

## 2018-03-14 PROCEDURE — 80053 COMPREHEN METABOLIC PANEL: CPT | Performed by: SPECIALIST

## 2018-03-14 PROCEDURE — 82306 VITAMIN D 25 HYDROXY: CPT | Performed by: SPECIALIST

## 2018-03-14 PROCEDURE — 82746 ASSAY OF FOLIC ACID SERUM: CPT | Performed by: SPECIALIST

## 2018-03-14 PROCEDURE — 82728 ASSAY OF FERRITIN: CPT | Performed by: SPECIALIST

## 2018-03-14 PROCEDURE — 87086 URINE CULTURE/COLONY COUNT: CPT | Performed by: SPECIALIST

## 2018-03-14 NOTE — MR AVS SNAPSHOT
[address] [id number] [facility] 
[phone] Patient: Mohit Cruz MRN: [id number] ZMR:[id number] Visit Information Date & Time Provider Department Dept. Phone Encounter #  
 3/14/2018 10:00 AM Tashi Tillman 80 [facility] [phone] [id number] Upcoming Health Maintenance Date Due Hepatitis C Screening 1955 DTaP/Tdap/Td series (1 - Tdap) 8/23/1974 PAP AKA CERVICAL CYTOLOGY 8/23/1974 BREAST CANCER SCRN MAMMOGRAM 8/23/2003 FOBT Q 1 YEAR AGE 50-75 8/23/2003 ZOSTER VACCINE AGE 60> 6/23/2013 Influenza Age 5 to Adult 8/1/2017 Allergies as of 3/14/2018  Review Complete On: 3/14/2018 By: Stephen Mendoza MD  
 No Known Allergies Current Immunizations  Never Reviewed No immunizations on file. Not reviewed this visit You Were Diagnosed With   
  
 Codes Comments Intestinal malabsorption, unspecified type    -  Primary ICD-10-CM: K90.9 ICD-9-CM: 579.9 S/P gastric surgery     ICD-10-CM: X32.873 ICD-9-CM: V45.89 Vitals BP Pulse Resp Height(growth percentile) Weight(growth percentile) SpO2  
 109/72 (BP 1 Location: Left arm, BP Patient Position: Sitting) (!) 55 16 5' 4\" (1.626 m) 165 lb (74.8 kg) 100% BMI OB Status Smoking Status 28.32 kg/m2 Hysterectomy Never Smoker Vitals History BMI and BSA Data Body Mass Index Body Surface Area  
 28.32 kg/m 2 1.84 m 2 Your Updated Medication List  
  
Notice  As of 3/14/2018 11:36 AM  
 You have not been prescribed any medications. To-Do List   
 03/14/2018 Microbiology:  CULTURE, URINE   
  
 03/14/2018 Lab:  URINALYSIS W/MICROSCOPIC Patient Instructions Body Mass Index: Care Instructions Your Care Instructions Body mass index (BMI) can help you see if your weight is raising your risk for health problems. It uses a formula to compare how much you weigh with how tall you are. · A BMI lower than 18.5 is considered underweight. · A BMI between 18.5 and 24.9 is considered healthy. · A BMI between 25 and 29.9 is considered overweight. A BMI of 30 or higher is considered obese. If your BMI is in the normal range, it means that you have a lower risk for weight-related health problems. If your BMI is in the overweight or obese range, you may be at increased risk for weight-related health problems, such as high blood pressure, heart disease, stroke, arthritis or joint pain, and diabetes. If your BMI is in the underweight range, you may be at increased risk for health problems such as fatigue, lower protection (immunity) against illness, muscle loss, bone loss, hair loss, and hormone problems. BMI is just one measure of your risk for weight-related health problems. You may be at higher risk for health problems if you are not active, you eat an unhealthy diet, or you drink too much alcohol or use tobacco products. Follow-up care is a key part of your treatment and safety. Be sure to make and go to all appointments, and call your doctor if you are having problems. It's also a good idea to know your test results and keep a list of the medicines you take. How can you care for yourself at home? · Practice healthy eating habits. This includes eating plenty of fruits, vegetables, whole grains, lean protein, and low-fat dairy. · If your doctor recommends it, get more exercise. Walking is a good choice. Bit by bit, increase the amount you walk every day. Try for at least 30 minutes on most days of the week. · Do not smoke. Smoking can increase your risk for health problems. If you need help quitting, talk to your doctor about stop-smoking programs and medicines. These can increase your chances of quitting for good. · Limit alcohol to 2 drinks a day for men and 1 drink a day for women.  Too much alcohol can cause health problems. If you have a BMI higher than 25 · Your doctor may do other tests to check your risk for weight-related health problems. This may include measuring the distance around your waist. A waist measurement of more than 40 inches in men or 35 inches in women can increase the risk of weight-related health problems. · Talk with your doctor about steps you can take to stay healthy or improve your health. You may need to make lifestyle changes to lose weight and stay healthy, such as changing your diet and getting regular exercise. If you have a BMI lower than 18.5 · Your doctor may do other tests to check your risk for health problems. · Talk with your doctor about steps you can take to stay healthy or improve your health. You may need to make lifestyle changes to gain or maintain weight and stay healthy, such as getting more healthy foods in your diet and doing exercises to build muscle. Where can you learn more? Go to http://freddie-derek.info/. Enter S176 in the search box to learn more about \"Body Mass Index: Care Instructions. \" Current as of: October 13, 2016 Content Version: 11.4 © 9586-9482 [employer]. Care instructions adapted under license by [employer] (which disclaims liability or warranty for this information). If you have questions about a medical condition or this instruction, always ask your healthcare professional. [place] [address] any warranty or liability for your use of this information. Patient Instructions 1. Continue to monitor carbohydrate and protein intake- remember to keep your           total  carbohydrates to 50 grams or less per day for best results. 2. Remember hydration goals - usually 48 to 64 ounces of liquids per day 3.  Continue to work towards exercise goals - minimum 3 days per week of 45 minutes to  1 hour at a time. 4. Remember to take vitamins as directed Supplement Resource Guide Importance of Protein:  
Maintains lean body mass, produces antibodies to fight off infections, heals wounds, minimizes hair loss, helps to give you energy, helps with satiety, and keeping you full between meals. Importance of Calcium: 
Needed for healthy bones and teeth, normal blood clotting, and nervous system functioning, higher risk of osteoporosis and bone disease with non-compliance. Importance of Multivitamins: Many functions. Supply you with extra nutrients that you may be missing from food. May lead to iron deficiency anemia, weakness, fatigue, and many other symptoms with non-compliance. Importance of B Vitamins: 
Important for red blood cell formation, metabolism, energy, and helps to maintain a healthy nervous system. Protein Supplement Find one you like now. Use immediately after surgery. Look for: 
35-50g protein each day from your protein supplement once you reach the progression diet. 0-3 g fat per serving 0-3 g sugar per serving Protein drinks should be split in separate dosages. Recommend: Lifelong 1 year + Calcium Supplement:  
 
Start taking within a month after surgery. Look for: Calcium Citrate Plus D (1500 mg per day) Recommend: Citracal 
 
 . Avoid chocolate chewable calcium. Can use chewable bariatric or GNC brand or similar chewable. The body cannot absorb more than 500-600 mg @ a time. Take for Life Multi-vitamin Supplement:   
 
1st Month After Surgery: Any complete chewable, such as: Spruce Pines Complete chewables. Avoid [place] Pine sours or gummies. They lack iron and other important nutrients and also have added sugar. Continue with chewable vitamin or change to adult complete multivitamin one month after surgery. Menstruating women can take a prenatal vitamin. Make sure has at least 18 mg iron and 469-585 mcg folic acid): Vitamin B12, B Complex Vitamin, and Biotin Start taking within a month after surgery. Vitamin B12:  1000 mcg of Vitamin B12 three times weekly Must take sublingually (meaning you take it under your tongue) or in a liquid drop form for easy absorption. B Complex Vitamin: Take a pill or liquid drop form once daily. Biotin: This vitamin can help prevent hair loss. Recommend 5mg  
(5000 mcg) a day Biotin is Optional  
 
 
 
 
 
 Patient Instructions History Introducing [facility]! Biancapolina Roberth introduces [email or web address] patient portal. Now you can access parts of your medical record, email your doctor's office, and request medication refills online. 1. In your internet browser, go to [email or web address] 2. Click on the First Time User? Click Here link in the Sign In box. You will see the New Member Sign Up page. 3. Enter your [email or web address] Access Code exactly as it appears below. You will not need to use this code after youve completed the sign-up process. If you do not sign up before the expiration date, you must request a new code. · [email or web address] Access Code: [email or web address] Expires: 4/10/2018  4:34 PM 
 
4. Enter the last four digits of your Social Security Number (xxxx) and Date of Birth (mm/dd/yyyy) as indicated and click Submit. You will be taken to the next sign-up page. 5. Create a [email or web address] ID. This will be your [email or web address] login ID and cannot be changed, so think of one that is secure and easy to remember. 6. Create a [email or web address] password. You can change your password at any time. 7. Enter your Password Reset Question and Answer. This can be used at a later time if you forget your password. 8. Enter your e-mail address. You will receive e-mail notification when new information is available in [address] 9. Click Sign Up. You can now view and download portions of your medical record. 10. Click the Download Summary menu link to download a portable copy of your medical information. If you have questions, please visit the Frequently Asked Questions section of the iAgree website. Remember, iAgree is NOT to be used for urgent needs. For medical emergencies, dial 911. Now available from your iPhone and Android! Please provide this summary of care documentation to your next provider. Your primary care clinician is listed as sAhley Garces. If you have any questions after today's visit, please call [phone].

## 2018-03-14 NOTE — PROGRESS NOTES
Subjective: Elio Marquez  is a 57 y.o. female who presents for follow-up about 7  months following laparoscopic sleeve gastrectomy. She has lost a total of 80 pounds since surgery. Body mass index is 28.32 kg/(m^2). . EBWL is (70%). The patient presents today to assess their progress toward their goal of weight loss and to address any issues that may be present. Today the patient and I have reviewed their diet and how appropriate their food choices are. The following issues have been identified : no issues at all. .  . Surgery related complication: none       She reports no issues and denies vomiting and abdominal pain. The patients diet choices have been reviewed today and are very good. Patients pain score:0      The patient's exercise level: very active. Changes in her medical history and medications have been reviewed.     Patient Active Problem List   Diagnosis Code    Hypercholesterolemia E78.00    Chronic back pain M54.9, G89.29    Snores R06.83    Morbid obesity (HCC) E66.01    GERD (gastroesophageal reflux disease) K21.9    Arthritic-like pain M25.50    BMI 38.0-38.9,adult Z68.38    S/P gastric surgery Z98.890    Intestinal malabsorption K90.9     Past Medical History:   Diagnosis Date    Arthritic-like pain     Chronic back pain     Chronic pain     back pain    GERD (gastroesophageal reflux disease)     Hypercholesterolemia     Morbid obesity ([facility] [address]     Morbid obesity with body mass index of 40.0-49.9 (HCC)     Cheryl     has not perfomed a sleep study     Past Surgical History:   Procedure Laterality Date    HX LIPOSUCTION      HX TOTAL ABDOMINAL HYSTERECTOMY      CO LAP, VICENTE RESTRICT PROC, LONGITUDINAL GASTRECTOMY      8/29/2017          Review of Symptoms:       General - No history or complaints of unexpected fever or chills  Head/Neck - No history or complaints of headache or dizziness  Cardiac - No history or complaints of chest pain, palpitations, or shortness of breath  Pulmonary - No history or complaints of shortness of breath or productive cough  Gastrointestinal - as noted above  Genitourinary - No history or complaints of hematuria/dysuria or renal lithiasis  Musculoskeletal - No history or complaints of joint  muscular weakness  Hematologic - No history of any bleeding episodes  Neurologic - No history or complaints of  migraine headaches or neurologic symptoms                     Objective:     Visit Vitals    /72 (BP 1 Location: Left arm, BP Patient Position: Sitting)    Pulse (!) 55    Resp 16    Ht 5' 4\" (1.626 m)    Wt 74.8 kg (165 lb)    SpO2 100%    BMI 28.32 kg/m2        Physical Exam:    General:  alert, cooperative, no distress, appears stated age   Lungs:   clear to auscultation bilaterally   Heart:  Regular rate and rhythm   Abdomen:   abdomen is soft without significant tenderness, masses, organomegaly or guarding; Incisions: healing well, no hernias         Lab Results   Component Value Date/Time    WBC 6.0 08/21/2017 01:16 PM    HGB 12.3 08/21/2017 01:16 PM    HCT 37.6 08/21/2017 01:16 PM    PLATELET 745 27/13/8670 01:16 PM    MCV 82.6 08/21/2017 01:16 PM     Lab Results   Component Value Date/Time    Sodium 143 08/21/2017 01:16 PM    Potassium 4.3 08/21/2017 01:16 PM    Chloride 108 08/21/2017 01:16 PM    CO2 27 08/21/2017 01:16 PM    Anion gap 8 08/21/2017 01:16 PM    Glucose 93 08/21/2017 01:16 PM    BUN 10 08/21/2017 01:16 PM    Creatinine 0.77 08/21/2017 01:16 PM    BUN/Creatinine ratio 13 08/21/2017 01:16 PM    GFR est AA >60 08/21/2017 01:16 PM    GFR est non-AA >60 08/21/2017 01:16 PM    Calcium 8.8 08/21/2017 01:16 PM    Bilirubin, total 0.3 08/21/2017 01:16 PM    AST (SGOT) 11 (L) 08/21/2017 01:16 PM    Alk.  phosphatase 99 08/21/2017 01:16 PM    Protein, total 6.7 08/21/2017 01:16 PM    Albumin 3.4 08/21/2017 01:16 PM    Globulin 3.3 08/21/2017 01:16 PM    A-G Ratio 1.0 08/21/2017 01:16 PM    ALT (SGPT) 14 08/21/2017 01:16 PM     No results found for: IRON, FE, TIBC, IBCT, PSAT, FERR  No results found for: FOL, RBCF  No results found for: VITD3, Jewel Peralta, XQVID, VD3RIA          Assessment:     1. History of Morbid obesity, status post  laparoscopic sleeve gastrectomy. Doing well; no concerns. Plan:     1. Remember to measure portions, continue low carbohydrate diet  2. Continue to concentrate on protein intake meeting daily requirements  3. Remember vitamin supplements. The importance of such was discussed regarding the malabsorptive issues that the surgery creates. 4. Exercise regimen appears to be: good  5. Try and attend support group if feasible. 6. Follow-up in 3 month(s). 7. Lab to be drawn. 8. Total time spent with the patient 30 minutes.

## 2018-03-14 NOTE — PATIENT INSTRUCTIONS
Body Mass Index: Care Instructions  Your Care Instructions    Body mass index (BMI) can help you see if your weight is raising your risk for health problems. It uses a formula to compare how much you weigh with how tall you are. · A BMI lower than 18.5 is considered underweight. · A BMI between 18.5 and 24.9 is considered healthy. · A BMI between 25 and 29.9 is considered overweight. A BMI of 30 or higher is considered obese. If your BMI is in the normal range, it means that you have a lower risk for weight-related health problems. If your BMI is in the overweight or obese range, you may be at increased risk for weight-related health problems, such as high blood pressure, heart disease, stroke, arthritis or joint pain, and diabetes. If your BMI is in the underweight range, you may be at increased risk for health problems such as fatigue, lower protection (immunity) against illness, muscle loss, bone loss, hair loss, and hormone problems. BMI is just one measure of your risk for weight-related health problems. You may be at higher risk for health problems if you are not active, you eat an unhealthy diet, or you drink too much alcohol or use tobacco products. Follow-up care is a key part of your treatment and safety. Be sure to make and go to all appointments, and call your doctor if you are having problems. It's also a good idea to know your test results and keep a list of the medicines you take. How can you care for yourself at home? · Practice healthy eating habits. This includes eating plenty of fruits, vegetables, whole grains, lean protein, and low-fat dairy. · If your doctor recommends it, get more exercise. Walking is a good choice. Bit by bit, increase the amount you walk every day. Try for at least 30 minutes on most days of the week. · Do not smoke. Smoking can increase your risk for health problems. If you need help quitting, talk to your doctor about stop-smoking programs and medicines. These can increase your chances of quitting for good. · Limit alcohol to 2 drinks a day for men and 1 drink a day for women. Too much alcohol can cause health problems. If you have a BMI higher than 25  · Your doctor may do other tests to check your risk for weight-related health problems. This may include measuring the distance around your waist. A waist measurement of more than 40 inches in men or 35 inches in women can increase the risk of weight-related health problems. · Talk with your doctor about steps you can take to stay healthy or improve your health. You may need to make lifestyle changes to lose weight and stay healthy, such as changing your diet and getting regular exercise. If you have a BMI lower than 18.5  · Your doctor may do other tests to check your risk for health problems. · Talk with your doctor about steps you can take to stay healthy or improve your health. You may need to make lifestyle changes to gain or maintain weight and stay healthy, such as getting more healthy foods in your diet and doing exercises to build muscle. Where can you learn more? Go to http://freddie-derek.info/. Enter S176 in the search box to learn more about \"Body Mass Index: Care Instructions. \"  Current as of: October 13, 2016  Content Version: 11.4  © 6746-8403 Healthwise, Incorporated. Care instructions adapted under license by [email or web address] (which disclaims liability or warranty for this information). If you have questions about a medical condition or this instruction, always ask your healthcare professional. [place] [address] any warranty or liability for your use of this information. Patient Instructions      1. Continue to monitor carbohydrate and protein intake- remember to keep your           total  carbohydrates to 50 grams or less per day for best results.   2. Remember hydration goals - usually 48 to 64 ounces of liquids per day  3. Continue to work towards exercise goals - minimum 3 days per week of 45          minutes to  1 hour at a time. 4. Remember to take vitamins as directed        Supplement Resource Guide    Importance of Protein:   Maintains lean body mass, produces antibodies to fight off infections, heals wounds, minimizes hair loss, helps to give you energy, helps with satiety, and keeping you full between meals. Importance of Calcium:  Needed for healthy bones and teeth, normal blood clotting, and nervous system functioning, higher risk of osteoporosis and bone disease with non-compliance. Importance of Multivitamins: Many functions. Supply you with extra nutrients that you may be missing from food. May lead to iron deficiency anemia, weakness, fatigue, and many other symptoms with non-compliance. Importance of B Vitamins:  Important for red blood cell formation, metabolism, energy, and helps to maintain a healthy nervous system. Protein Supplement  Find one you like now. Use immediately after surgery. Look for:  35-50g protein each day from your protein supplement once you reach the progression diet. 0-3 g fat per serving  0-3 g sugar per serving    Protein drinks should be split in separate dosages. Recommend: Lifelong  1 year + Calcium Supplement:     Start taking within a month after surgery. Look for: Calcium Citrate Plus D (1500 mg per day)  Recommend: Citracal     .          Avoid chocolate chewable calcium. Can use chewable bariatric or [employer] brand or similar chewable. The body cannot absorb more than 500-600 mg @ a time. Take for Life Multi-vitamin Supplement:      1st Month After Surgery: Any complete chewable, such as: [place]s Complete chewables. Avoid [place] sours or gummies. They lack iron and other important nutrients and also have added sugar.     Continue with chewable vitamin or change to adult complete multivitamin one month after surgery. Menstruating women can take a prenatal vitamin. Make sure has at least 18 mg iron and 619-483 mcg folic acid):    Vitamin B12, B Complex Vitamin, and Biotin  Start taking within a month after surgery. Vitamin B12:  1000 mcg of Vitamin B12 three times weekly    Must take sublingually (meaning you take it under your tongue) or in a liquid drop form for easy absorption. B Complex Vitamin: Take a pill or liquid drop form once daily. Biotin: This vitamin can help prevent hair loss.     Recommend 5mg   (5000 mcg) a day  Biotin is Optional

## 2018-03-15 ENCOUNTER — TELEPHONE (OUTPATIENT)
Dept: SURGERY | Age: 63
End: 2018-03-15

## 2018-03-15 RX ORDER — ERGOCALCIFEROL 1.25 MG/1
50000 CAPSULE ORAL 2 TIMES WEEKLY
Qty: 52 CAP | Refills: 1 | Status: SHIPPED | OUTPATIENT
Start: 2018-03-16 | End: 2018-09-12

## 2018-03-15 NOTE — TELEPHONE ENCOUNTER
----- Message from Jackqulyn Jeans, MD sent at 3/15/2018 10:13 AM EDT -----  Call in Vitamin D 50,000 units twice weekly for 1 year

## 2018-03-15 NOTE — TELEPHONE ENCOUNTER
Talked with pt about the [employer] product. Discussed how to use this product (she has not been taking any vitamins). Discussed how she needs B12, multivitamin with iron, Vit. D, and Calcium citrate for life to prevent low levels in the future.

## 2018-03-16 LAB
BACTERIA SPEC CULT: NORMAL
SERVICE CMNT-IMP: NORMAL
VIT B1 BLD-SCNC: 76.6 NMOL/L (ref 66.5–200)

## 2018-06-20 ENCOUNTER — OFFICE VISIT (OUTPATIENT)
Dept: SURGERY | Age: 63
End: 2018-06-20

## 2018-06-20 VITALS
WEIGHT: 152.4 LBS | HEART RATE: 60 BPM | DIASTOLIC BLOOD PRESSURE: 75 MMHG | SYSTOLIC BLOOD PRESSURE: 131 MMHG | BODY MASS INDEX: 26.02 KG/M2 | OXYGEN SATURATION: 100 % | RESPIRATION RATE: 16 BRPM | TEMPERATURE: 97.6 F | HEIGHT: 64 IN

## 2018-06-20 DIAGNOSIS — K90.89 OTHER SPECIFIED INTESTINAL MALABSORPTION: Primary | ICD-10-CM

## 2018-06-20 DIAGNOSIS — Z98.890 S/P GASTRIC SURGERY: ICD-10-CM

## 2018-06-20 RX ORDER — PEDIATRIC MULTIVITAMIN NO.17
1 TABLET,CHEWABLE ORAL DAILY
COMMUNITY

## 2018-06-20 NOTE — PROGRESS NOTES
10 months follow-up    Subjective: Chasidy Mccurdy  is a 57 y.o. female who presents for follow-up about 10 months following laparoscopic sleeve gastrectomy. She has lost a total of 92 pounds since surgery. Body mass index is 26.16 kg/(m^2). . EBWL is (81%). The patient presents today to assess their progress toward their goal of weight loss and to address any issues that may be present. Today the patient and I have reviewed their diet and how appropriate their food choices are. The following issues have been identified - none from a surgical standpoint. Pain assessment - 0/10  . Surgery related complication: NA       She reports no real issues and denies vomiting, abdominal pain, diarrhea and difficulty breathing. The patient's exercise level: moderately active. Changes in her medical history and medications have been reviewed. Patient Active Problem List   Diagnosis Code    Hypercholesterolemia E78.00    Chronic back pain M54.9, G89.29    Snores R06.83    Morbid obesity (HCC) E66.01    GERD (gastroesophageal reflux disease) K21.9    Arthritic-like pain M25.50    BMI 38.0-38.9,adult Z68.38    S/P gastric surgery Z98.890    Intestinal malabsorption K90.9     Past Medical History:   Diagnosis Date    Arthritic-like pain     Chronic back pain     Chronic pain     back pain    GERD (gastroesophageal reflux disease)     Hypercholesterolemia     Morbid obesity ([facility] [address]     Morbid obesity with body mass index of 40.0-49.9 (HCC)     Cheryl     has not perfomed a sleep study     Past Surgical History:   Procedure Laterality Date    HX LIPOSUCTION      HX TOTAL ABDOMINAL HYSTERECTOMY      MA LAP, VICENTE RESTRICT PROC, LONGITUDINAL GASTRECTOMY      8/29/2017     Current Outpatient Prescriptions   Medication Sig Dispense Refill    pediatric multivitamins chewable tablet Take 1 Tab by mouth daily.       ergocalciferol (ERGOCALCIFEROL) 50,000 unit capsule Take 1 Cap by mouth two (2) times a week for 180 days. Indications: VITAMIN D DEFICIENCY (HIGH DOSE THERAPY) 52 Cap 1          Review of Symptoms:     General - No history or complaints of unexpected fever or chills  Head/Neck - No history or complaints of headache or dizziness  Cardiac - No history or complaints of chest pain, palpitations, or shortness of breath  Pulmonary - No history or complaints of shortness of breath or productive cough  Gastrointestinal - as noted above  Genitourinary - No history or complaints of hematuria/dysuria or renal lithiasis  Musculoskeletal - No history or complaints of joint  muscular weakness  Hematologic - No history of any bleeding episodes  Neurologic - No history or complaints of  migraine headaches or neurologic symptoms    Objective:     Visit Vitals    /75 (BP 1 Location: Left arm, BP Patient Position: Sitting)    Pulse 60    Temp 97.6 °F (36.4 °C)    Resp 16    Ht 5' 4\" (1.626 m)    Wt 69.1 kg (152 lb 6.4 oz)    SpO2 100%    BMI 26.16 kg/m2        Physical Exam:    General:  alert, cooperative, no distress, appears stated age   Lungs:   clear to auscultation bilaterally   Heart:  Regular rate and rhythm   Abdomen:   abdomen is soft without significant tenderness, masses, organomegaly or guarding; Incisions: healing well, no significant drainage         Assessment:     1. History of Morbid obesity, status post  laparoscopic sleeve gastrectomy. Doing well, no concerns. She desires another 12 lbs of weight loss to get in to an \"8-10\" size dress. No recent PCP visit since Dr. Jerrod Mitchell - will consider switching to Dr. Johana Ling. She is considering moving to [place] for a possible job offer. Plan:     1. Remember to measure portions, continue low carbohydrate diet  2. Continue to watch portion size. 3. Remember vitamin supplements - The importance of such was discussed regarding the malabsorptive issues that the surgery creates  4. Exercise regimen appears adequate.   5. Attend support group  6. Follow-up in 3 month(s). 7. Total time spent with the patient 30 minutes. 8. Obtain lab prior to next visit which will be 1 year anniversary.   9. The patient understands the plan of action

## 2018-06-20 NOTE — PATIENT INSTRUCTIONS
Body Mass Index: Care Instructions  Your Care Instructions    Body mass index (BMI) can help you see if your weight is raising your risk for health problems. It uses a formula to compare how much you weigh with how tall you are. · A BMI lower than 18.5 is considered underweight. · A BMI between 18.5 and 24.9 is considered healthy. · A BMI between 25 and 29.9 is considered overweight. A BMI of 30 or higher is considered obese. If your BMI is in the normal range, it means that you have a lower risk for weight-related health problems. If your BMI is in the overweight or obese range, you may be at increased risk for weight-related health problems, such as high blood pressure, heart disease, stroke, arthritis or joint pain, and diabetes. If your BMI is in the underweight range, you may be at increased risk for health problems such as fatigue, lower protection (immunity) against illness, muscle loss, bone loss, hair loss, and hormone problems. BMI is just one measure of your risk for weight-related health problems. You may be at higher risk for health problems if you are not active, you eat an unhealthy diet, or you drink too much alcohol or use tobacco products. Follow-up care is a key part of your treatment and safety. Be sure to make and go to all appointments, and call your doctor if you are having problems. It's also a good idea to know your test results and keep a list of the medicines you take. How can you care for yourself at home? · Practice healthy eating habits. This includes eating plenty of fruits, vegetables, whole grains, lean protein, and low-fat dairy. · If your doctor recommends it, get more exercise. Walking is a good choice. Bit by bit, increase the amount you walk every day. Try for at least 30 minutes on most days of the week. · Do not smoke. Smoking can increase your risk for health problems. If you need help quitting, talk to your doctor about stop-smoking programs and medicines. These can increase your chances of quitting for good. · Limit alcohol to 2 drinks a day for men and 1 drink a day for women. Too much alcohol can cause health problems. If you have a BMI higher than 25  · Your doctor may do other tests to check your risk for weight-related health problems. This may include measuring the distance around your waist. A waist measurement of more than 40 inches in men or 35 inches in women can increase the risk of weight-related health problems. · Talk with your doctor about steps you can take to stay healthy or improve your health. You may need to make lifestyle changes to lose weight and stay healthy, such as changing your diet and getting regular exercise. If you have a BMI lower than 18.5  · Your doctor may do other tests to check your risk for health problems. · Talk with your doctor about steps you can take to stay healthy or improve your health. You may need to make lifestyle changes to gain or maintain weight and stay healthy, such as getting more healthy foods in your diet and doing exercises to build muscle. Where can you learn more? Go to http://freddie-derek.info/. Enter S176 in the search box to learn more about \"Body Mass Index: Care Instructions. \"  Current as of: October 13, 2016  Content Version: 11.4  © 3715-1050 Healthwise, Incorporated. Care instructions adapted under license by [employer] (which disclaims liability or warranty for this information). If you have questions about a medical condition or this instruction, always ask your healthcare professional. [place] [address] any warranty or liability for your use of this information. Patient Instructions      1. Continue to monitor carbohydrate and protein intake- remember to keep your           total  carbohydrates to 50 grams or less per day for best results.   2. Remember hydration goals - usually 48 to 64 ounces of liquids per day  3. Continue to work towards exercise goals - minimum 3 days per week of 45          minutes to  1 hour at a time. 4. Remember to take vitamins as directed        Supplement Resource Guide    Importance of Protein:   Maintains lean body mass, produces antibodies to fight off infections, heals wounds, minimizes hair loss, helps to give you energy, helps with satiety, and keeping you full between meals. Importance of Calcium:  Needed for healthy bones and teeth, normal blood clotting, and nervous system functioning, higher risk of osteoporosis and bone disease with non-compliance. Importance of Multivitamins: Many functions. Supply you with extra nutrients that you may be missing from food. May lead to iron deficiency anemia, weakness, fatigue, and many other symptoms with non-compliance. Importance of B Vitamins:  Important for red blood cell formation, metabolism, energy, and helps to maintain a healthy nervous system. Protein Supplement  Find one you like now. Use immediately after surgery. Look for:  35-50g protein each day from your protein supplement once you reach the progression diet. 0-3 g fat per serving  0-3 g sugar per serving    Protein drinks should be split in separate dosages. Recommend: Lifelong  1 year + Calcium Supplement:     Start taking within a month after surgery. Look for: Calcium Citrate Plus D (1500 mg per day)  Recommend: Citracal     .          Avoid chocolate chewable calcium. Can use chewable bariatric or [employer] brand or similar chewable. The body cannot absorb more than 500-600 mg @ a time. Take for Life Multi-vitamin Supplement:      1st Month After Surgery: Any complete chewable, such as: [place]s Complete chewables. Avoid [place] sours or gummies. They lack iron and other important nutrients and also have added sugar.     Continue with chewable vitamin or change to adult complete multivitamin one month after surgery. Menstruating women can take a prenatal vitamin. Make sure has at least 18 mg iron and 110-327 mcg folic acid):    Vitamin B12, B Complex Vitamin, and Biotin  Start taking within a month after surgery. Vitamin B12:  1000 mcg of Vitamin B12 three times weekly    Must take sublingually (meaning you take it under your tongue) or in a liquid drop form for easy absorption. B Complex Vitamin: Take a pill or liquid drop form once daily. Biotin: This vitamin can help prevent hair loss.     Recommend 5mg   (5000 mcg) a day  Biotin is Optional

## 2019-07-25 ENCOUNTER — DOCUMENTATION ONLY (OUTPATIENT)
Dept: SURGERY | Age: 64
End: 2019-07-25

## 2019-07-25 NOTE — LETTER
[facility] 
 
 
Dear Patient, Your health is our main concern. It is important for your health to have follow-up lab work and to see you surgeon at 2 months, 4 months, 6 months, 9 months and annually after your weight loss surgery. Additionally, the Department of Bariatric Surgery at our hospital is a member of the [address] Surgical Quality Improvement Program ([facility] NSQIP). As a participant in this program, we gather information on the outcomes of our patients after surgery. Please call the office for a follow up appointment at [phone]. If you have moved out of the area or have changed surgeons please call us and let us know the name of your doctor. Your health and feedback are important to us. We greatly appreciate your response. Thank you, [facility] [place] Jordan rowe

## 2019-07-25 NOTE — PROGRESS NOTES
Per [facility] requirements;  E-mail and letter sent for follow up appointment. [facility] Loss [place]  [facility]  [facility]      Dear Patient,    Your health is our main concern. It is important for your health to have follow-up lab work and to see you surgeon at 2 months, 4 months, 6 months, 9 months and annually after your weight loss surgery. Additionally, the Department of Bariatric Surgery at our hospital is a member of the [employer] [address] Surgical Quality Improvement Program ([facility] NSQIP). As a participant in this program, we gather information on the outcomes of our patients after surgery. Please call the office for a follow up appointment at [phone]. If you have moved out of the area or have changed surgeons please call us and let us know the name of your doctor. Your health and feedback are important to us. We greatly appreciate your response.        Thank you,  [facility] Loss [address] [facility]

## 2019-07-25 NOTE — LETTER
Josie Mercado [facility] [place] Josie Mercado Surgical Specialists [facility] 
 
 
Dear Patient, Your health is our main concern. It is important for your health to have follow-up lab work and to see you surgeon at 2 months, 4 months, 6 months, 9 months and annually after your weight loss surgery. Additionally, the Department of Bariatric Surgery at our hospital is a member of the [employer] of [address] [place] [address] Surgical Quality Improvement Program ([facility] NSQIP). As a participant in this program, we gather information on the outcomes of our patients after surgery. Please call the office for a follow up appointment at [phone]. If you have moved out of the area or have changed surgeons please call us and let us know the name of your doctor. Your health and feedback are important to us. We greatly appreciate your response. Thank you, Josie Mercado [facility] Loss [facility]

## 2019-07-25 NOTE — PROGRESS NOTES
Per [facility] requirements;  E-mail and letter sent for follow up appointment. [facility] Loss [place]  [address] [place] [address] Surgical Specialists  [facility]      Dear Patient,    Your health is our main concern. It is important for your health to have follow-up lab work and to see you surgeon at 2 months, 4 months, 6 months, 9 months and annually after your weight loss surgery. Additionally, the Department of Bariatric Surgery at our hospital is a member of the [employer] [address] Surgical Quality Improvement Program ([facility] NSQIP). As a participant in this program, we gather information on the outcomes of our patients after surgery. Please call the office for a follow up appointment at [phone]. If you have moved out of the area or have changed surgeons please call us and let us know the name of your doctor. Your health and feedback are important to us. We greatly appreciate your response.        Thank you,  [facility]

## 2019-11-20 ENCOUNTER — OFFICE VISIT (OUTPATIENT)
Dept: SURGERY | Age: 64
End: 2019-11-20

## 2019-11-20 ENCOUNTER — HOSPITAL ENCOUNTER (OUTPATIENT)
Dept: LAB | Age: 64
Discharge: HOME | End: 2019-11-20
Payer: COMMERCIAL

## 2019-11-20 VITALS
SYSTOLIC BLOOD PRESSURE: 138 MMHG | OXYGEN SATURATION: 100 % | WEIGHT: 137 LBS | HEIGHT: 64 IN | TEMPERATURE: 97.8 F | BODY MASS INDEX: 23.39 KG/M2 | DIASTOLIC BLOOD PRESSURE: 85 MMHG | RESPIRATION RATE: 16 BRPM | HEART RATE: 60 BPM

## 2019-11-20 DIAGNOSIS — Z98.890 S/P GASTRIC SURGERY: ICD-10-CM

## 2019-11-20 DIAGNOSIS — K90.9 INTESTINAL MALABSORPTION, UNSPECIFIED TYPE: Primary | ICD-10-CM

## 2019-11-20 LAB
25(OH)D3 SERPL-MCNC: 35.6 NG/ML (ref 30–100)
BASOPHILS # BLD: 0 K/UL (ref 0–0.1)
BASOPHILS NFR BLD: 0 % (ref 0–2)
DIFFERENTIAL METHOD BLD: ABNORMAL
EOSINOPHIL # BLD: 0 K/UL (ref 0–0.4)
EOSINOPHIL NFR BLD: 1 % (ref 0–5)
ERYTHROCYTE [DISTWIDTH] IN BLOOD BY AUTOMATED COUNT: 13.1 % (ref 11.6–14.5)
FERRITIN SERPL-MCNC: 85 NG/ML (ref 8–388)
FOLATE SERPL-MCNC: 12 NG/ML (ref 3.1–17.5)
HCT VFR BLD AUTO: 38.7 % (ref 35–45)
HGB BLD-MCNC: 12 G/DL (ref 12–16)
IRON SERPL-MCNC: 108 UG/DL (ref 50–175)
LYMPHOCYTES # BLD: 1.7 K/UL (ref 0.9–3.6)
LYMPHOCYTES NFR BLD: 45 % (ref 21–52)
MCH RBC QN AUTO: 26.8 PG (ref 24–34)
MCHC RBC AUTO-ENTMCNC: 31 G/DL (ref 31–37)
MCV RBC AUTO: 86.6 FL (ref 74–97)
MONOCYTES # BLD: 0.3 K/UL (ref 0.05–1.2)
MONOCYTES NFR BLD: 8 % (ref 3–10)
NEUTS SEG # BLD: 1.7 K/UL (ref 1.8–8)
NEUTS SEG NFR BLD: 46 % (ref 40–73)
PLATELET # BLD AUTO: 285 K/UL (ref 135–420)
PMV BLD AUTO: 9.9 FL (ref 9.2–11.8)
RBC # BLD AUTO: 4.47 M/UL (ref 4.2–5.3)
VIT B12 SERPL-MCNC: 320 PG/ML (ref 211–911)
WBC # BLD AUTO: 3.7 K/UL (ref 4.6–13.2)

## 2019-11-20 PROCEDURE — 36415 COLL VENOUS BLD VENIPUNCTURE: CPT

## 2019-11-20 PROCEDURE — 82306 VITAMIN D 25 HYDROXY: CPT

## 2019-11-20 PROCEDURE — 82728 ASSAY OF FERRITIN: CPT

## 2019-11-20 PROCEDURE — 83540 ASSAY OF IRON: CPT

## 2019-11-20 PROCEDURE — 85025 COMPLETE CBC W/AUTO DIFF WBC: CPT

## 2019-11-20 PROCEDURE — 82607 VITAMIN B-12: CPT

## 2019-11-20 PROCEDURE — 84425 ASSAY OF VITAMIN B-1: CPT

## 2019-11-20 RX ORDER — LANOLIN ALCOHOL/MO/W.PET/CERES
1000 CREAM (GRAM) TOPICAL DAILY
COMMUNITY

## 2019-11-20 NOTE — PROGRESS NOTES
Ricardoflaquita Carr presents today for   Chief Complaint   Patient presents with    Follow-up     Pt is here today for her follow up       Is someone accompanying this pt? no    Is the patient using any DME equipment during OV? no    Depression Screening:  3 most recent PHQ Screens 11/20/2019   Little interest or pleasure in doing things Not at all   Feeling down, depressed, irritable, or hopeless Not at all   Total Score PHQ 2 0       Learning Assessment:  Learning Assessment 7/18/2016   PRIMARY LEARNER Patient   HIGHEST LEVEL OF EDUCATION - PRIMARY LEARNER  > 4 YEARS OF COLLEGE   BARRIERS PRIMARY LEARNER NONE   PRIMARY LANGUAGE ENGLISH   LEARNER PREFERENCE PRIMARY VIDEOS   ANSWERED BY patient   RELATIONSHIP SELF       Abuse Screening:  Abuse Screening Questionnaire 11/20/2019   Do you ever feel afraid of your partner? N   Are you in a relationship with someone who physically or mentally threatens you? N   Is it safe for you to go home? Y       Fall Risk  Fall Risk Assessment, last 12 mths 11/20/2019   Able to walk? Yes   Fall in past 12 months? No         Coordination of Care:  1. Have you been to the ER, urgent care clinic since your last visit? Hospitalized since your last visit? no    2. Have you seen or consulted any other health care providers outside of the [address] [place] [address] since your last visit? Include any pap smears or colon screening.  no

## 2019-11-20 NOTE — PROGRESS NOTES
Subjective: Emili Sow  is a 75 y.o. female who presents for follow-up about 2.2 years following laparoscopic sleeve gastrectomy. She has lost a total of 107 pounds since surgery. Body mass index is 23.52 kg/m². . EBWL is (95%). The patient presents today to assess their progress toward their goal of weight loss and to address any issues that may be present. Today the patient and I have reviewed their diet and how appropriate their food choices are. The following issues have been identified : no new issues. Weight Loss Metrics 11/20/2019 6/20/2018 3/14/2018 1/10/2018 10/27/2017 9/29/2017 9/29/2017   Today's Wt 137 lb 152 lb 6.4 oz 165 lb 181 lb 207 lb 218 lb 218 lb   BMI 23.52 kg/m2 26.16 kg/m2 28.32 kg/m2 31.07 kg/m2 35.53 kg/m2 37.42 kg/m2 37.42 kg/m2     . Surgery related complication: none       She reports no issues and denies vomiting and abdominal pain. The patients diet choices have been reviewed today and are very good  Patients pain score:0    Weight Loss Metrics 11/20/2019 6/20/2018 3/14/2018 1/10/2018 10/27/2017 9/29/2017 9/29/2017   Today's Wt 137 lb 152 lb 6.4 oz 165 lb 181 lb 207 lb 218 lb 218 lb   BMI 23.52 kg/m2 26.16 kg/m2 28.32 kg/m2 31.07 kg/m2 35.53 kg/m2 37.42 kg/m2 37.42 kg/m2        The patient's exercise level: very active. Changes in her medical history and medications have been reviewed.     Patient Active Problem List   Diagnosis Code    Chronic back pain M54.9, G89.29    Arthritic-like pain M25.50    S/P gastric surgery Z98.890    Intestinal malabsorption K90.9     Past Medical History:   Diagnosis Date    Arthritic-like pain     Chronic back pain     Chronic pain     back pain    GERD (gastroesophageal reflux disease)     Hypercholesterolemia     Morbid obesity ([facility] [address]     Morbid obesity with body mass index of 40.0-49.9 (HCC)     Snores     has not perfomed a sleep study     Past Surgical History:   Procedure Laterality Date    HX LIPOSUCTION      HX TOTAL ABDOMINAL HYSTERECTOMY      ID LAP, VICENTE RESTRICT PROC, LONGITUDINAL GASTRECTOMY      8/29/2017     Current Outpatient Medications   Medication Sig Dispense Refill    cyanocobalamin 1,000 mcg tablet Take 1,000 mcg by mouth daily.  pediatric multivitamins chewable tablet Take 1 Tab by mouth daily. Review of Symptoms:       General - No history or complaints of unexpected fever or chills  Head/Neck - No history or complaints of headache or dizziness  Cardiac - No history or complaints of chest pain, palpitations, or shortness of breath  Pulmonary - No history or complaints of shortness of breath or productive cough  Gastrointestinal - as noted above  Genitourinary - No history or complaints of hematuria/dysuria or renal lithiasis  Musculoskeletal - No history or complaints of joint  muscular weakness  Hematologic - No history of any bleeding episodes  Neurologic - No history or complaints of  migraine headaches or neurologic symptoms                     Objective:     Visit Vitals  /85 (BP 1 Location: Left arm, BP Patient Position: Sitting)   Pulse 60   Temp 97.8 °F (36.6 °C)   Resp 16   Ht 5' 4\" (1.626 m)   Wt 62.1 kg (137 lb)   SpO2 100%   BMI 23.52 kg/m²        Physical Exam:    General:  alert, cooperative, no distress, appears stated age   Lungs:   clear to auscultation bilaterally   Heart:  Regular rate and rhythm   Abdomen:   abdomen is soft without significant tenderness, masses, organomegaly or guarding;     Incisions: healing well, no hernias       Lab Results   Component Value Date/Time    WBC 5.4 03/14/2018 12:14 PM    HGB 12.1 03/14/2018 12:14 PM    HCT 37.7 03/14/2018 12:14 PM    PLATELET 698 28/23/1699 12:14 PM    MCV 84.5 03/14/2018 12:14 PM     Lab Results   Component Value Date/Time    Sodium 145 03/14/2018 12:14 PM    Potassium 4.7 03/14/2018 12:14 PM    Chloride 106 03/14/2018 12:14 PM    CO2 31 03/14/2018 12:14 PM    Anion gap 8 03/14/2018 12:14 PM    Glucose 84 03/14/2018 12:14 PM    BUN 13 03/14/2018 12:14 PM    Creatinine 0.64 03/14/2018 12:14 PM    BUN/Creatinine ratio 20 03/14/2018 12:14 PM    GFR est AA >60 03/14/2018 12:14 PM    GFR est non-AA >60 03/14/2018 12:14 PM    Calcium 9.4 03/14/2018 12:14 PM    Bilirubin, total 0.4 03/14/2018 12:14 PM    AST (SGOT) 10 (L) 03/14/2018 12:14 PM    Alk. phosphatase 84 03/14/2018 12:14 PM    Protein, total 6.8 03/14/2018 12:14 PM    Albumin 3.8 03/14/2018 12:14 PM    Globulin 3.0 03/14/2018 12:14 PM    A-G Ratio 1.3 03/14/2018 12:14 PM    ALT (SGPT) 13 03/14/2018 12:14 PM     Lab Results   Component Value Date/Time    Iron 75 03/14/2018 12:14 PM    Ferritin 133 03/14/2018 12:14 PM     Lab Results   Component Value Date/Time    Folate 7.0 03/14/2018 12:14 PM     Lab Results   Component Value Date/Time    Vitamin D 25-Hydroxy 6.3 (L) 03/14/2018 12:14 PM           Assessment:     1. History of Morbid obesity, status post  laparoscopic sleeve gastrectomy. Doing well; no concerns. .     Plan:     1. Remember to measure portions, continue low carbohydrate diet  2. Continue to concentrate on protein intake meeting daily requirements  3. Remember vitamin supplements. The importance of such was discussed regarding the malabsorptive issues that the surgery creates. 4. Exercise regimen appears to be: very good  5. Try and attend support group if feasible. 6. Follow-up in 1 year(s). 7. Lab ordered  8. Total time spent with the patient 30 minutes.

## 2019-11-20 NOTE — PATIENT INSTRUCTIONS
Body Mass Index: Care Instructions Your Care Instructions Body mass index (BMI) can help you see if your weight is raising your risk for health problems. It uses a formula to compare how much you weigh with how tall you are. · A BMI lower than 18.5 is considered underweight. · A BMI between 18.5 and 24.9 is considered healthy. · A BMI between 25 and 29.9 is considered overweight. A BMI of 30 or higher is considered obese. If your BMI is in the normal range, it means that you have a lower risk for weight-related health problems. If your BMI is in the overweight or obese range, you may be at increased risk for weight-related health problems, such as high blood pressure, heart disease, stroke, arthritis or joint pain, and diabetes. If your BMI is in the underweight range, you may be at increased risk for health problems such as fatigue, lower protection (immunity) against illness, muscle loss, bone loss, hair loss, and hormone problems. BMI is just one measure of your risk for weight-related health problems. You may be at higher risk for health problems if you are not active, you eat an unhealthy diet, or you drink too much alcohol or use tobacco products. Follow-up care is a key part of your treatment and safety. Be sure to make and go to all appointments, and call your doctor if you are having problems. It's also a good idea to know your test results and keep a list of the medicines you take. How can you care for yourself at home? · Practice healthy eating habits. This includes eating plenty of fruits, vegetables, whole grains, lean protein, and low-fat dairy. · If your doctor recommends it, get more exercise. Walking is a good choice. Bit by bit, increase the amount you walk every day. Try for at least 30 minutes on most days of the week. · Do not smoke. Smoking can increase your risk for health problems.  If you need help quitting, talk to your doctor about stop-smoking programs and medicines. These can increase your chances of quitting for good. · Limit alcohol to 2 drinks a day for men and 1 drink a day for women. Too much alcohol can cause health problems. If you have a BMI higher than 25 · Your doctor may do other tests to check your risk for weight-related health problems. This may include measuring the distance around your waist. A waist measurement of more than 40 inches in men or 35 inches in women can increase the risk of weight-related health problems. · Talk with your doctor about steps you can take to stay healthy or improve your health. You may need to make lifestyle changes to lose weight and stay healthy, such as changing your diet and getting regular exercise. If you have a BMI lower than 18.5 · Your doctor may do other tests to check your risk for health problems. · Talk with your doctor about steps you can take to stay healthy or improve your health. You may need to make lifestyle changes to gain or maintain weight and stay healthy, such as getting more healthy foods in your diet and doing exercises to build muscle. Where can you learn more? Go to http://freddie-derek.info/. Enter S176 in the search box to learn more about \"Body Mass Index: Care Instructions. \" Current as of: March 28, 2019 Content Version: 12.2 © 2611-8161 [employer]. Care instructions adapted under license by [employer] (which disclaims liability or warranty for this information). If you have questions about a medical condition or this instruction, always ask your healthcare professional. [address] any warranty or liability for your use of this information.

## 2019-11-22 LAB — VIT B1 BLD-SCNC: 115.6 NMOL/L (ref 66.5–200)

## 2020-07-20 ENCOUNTER — DOCUMENTATION ONLY (OUTPATIENT)
Dept: SURGERY | Age: 65
End: 2020-07-20

## 2020-07-20 NOTE — PROGRESS NOTES
Per [facility] requirements;  E-mail and letter sent for follow up appointment. [facility] Surgical Specialist  [address] [place], [address] [facility] Weight Loss [place]  [facility] Surgical Specialists  [facility]      Dear Patient,    Your health is our main concern. It is important for your health to have follow-up lab work and to see your surgeon at 2 months, 4 months, 6 months, 9 months and annually after your weight loss surgery. Additionally, the Department of Bariatric Surgery at our hospital is a member of the [employer] of [address] [place] [address] Surgical Quality Improvement Program ([facility] NSQIP). As a participant in this program, we gather information on the outcomes of our patients after surgery. Please call the office for a follow up appointment at [phone]. If you have moved out of the area or have changed surgeons please call us and let us know the name of your doctor. Your health and feedback are important to us. We greatly appreciate your response.        Thank you,  [facility] Loss [facility] [facility]

## 2020-07-20 NOTE — LETTER
[facility] Surgical Specialist 
[address] [facility], [address] [facility] Surgical Specialists [facility] 
 
 
Dear Patient, Your health is our main concern. It is important for your health to have follow-up lab work and to see your surgeon at 2 months, 4 months, 6 months, 9 months and annually after your weight loss surgery. Additionally, the Department of Bariatric Surgery at our hospital is a member of the [employer] [address] Surgical Quality Improvement Program ([facility] NSQIP). As a participant in this program, we gather information on the outcomes of our patients after surgery. Please call the office for a follow up appointment at [phone]. If you have moved out of the area or have changed surgeons please call us and let us know the name of your doctor. Your health and feedback are important to us. We greatly appreciate your response. Thank you, [facility]

## 2021-07-14 ENCOUNTER — DOCUMENTATION ONLY (OUTPATIENT)
Dept: SURGERY | Age: 66
End: 2021-07-14

## 2021-07-14 NOTE — PROGRESS NOTES
Per [facility] requirements;  E-mail and letter sent for follow up appointment. [facility] Surgical Specialist  [address] [place], [address] [facility] Weight Loss [place]  Jaci Smith [facility] Surgical Specialists  [facility]      Dear Patient,    Your health is our main concern. It is important for your health to have follow-up lab work and to see your surgeon at 2 months, 4 months, 6 months, 9 months and annually after your weight loss surgery. Additionally, the Department of Bariatric Surgery at our hospital is a member of the Metabolic and Bariatric Surgery Accreditation and Quality Improvement Program [facility]). As a participant in this program, we gather information on the outcomes of our patients after surgery. Please call the office for a follow up appointment at [phone]. If you have moved out of the area or have changed surgeons please call us and let us know the name of your doctor. Your health and feedback are important to us. We greatly appreciate your response.        Thank you,  [facility] [place] [facility] [address] [facility]

## 2022-02-15 NOTE — PROGRESS NOTES
Subjective: Chayo Schwartz  is a 57 y.o. female who presents for follow-up about 2 months following laparoscopic sleeve gastrectomy. She has lost a total of 37 pounds since surgery. Body mass index is 35.53 kg/(m^2). .    Surgery related complication: none       She reports no issues and denies vomiting and abdominal pain. Patients pain score:0      The patient's exercise level: moderately active. Changes in her medical history and medications have been reviewed. Patient Active Problem List   Diagnosis Code    Hypercholesterolemia E78.00    Chronic back pain M54.9, G89.29    Snores R06.83    Morbid obesity (HCC) E66.01    GERD (gastroesophageal reflux disease) K21.9    Arthritic-like pain M25.50    BMI 38.0-38.9,adult Z68.38    S/P gastric surgery Z98.890    Intestinal malabsorption K90.9     Past Medical History:   Diagnosis Date    Arthritic-like pain     Chronic back pain     Chronic pain     back pain    GERD (gastroesophageal reflux disease)     Hypercholesterolemia     Morbid obesity ([address]     Morbid obesity with body mass index of 40.0-49.9 (HCC)     Cheryl     has not perfomed a sleep study     Past Surgical History:   Procedure Laterality Date    HX LIPOSUCTION      HX TOTAL ABDOMINAL HYSTERECTOMY       Current Outpatient Prescriptions   Medication Sig Dispense Refill    multivitamin (ONE A DAY) tablet Take 1 Tab by mouth daily. Objective:     Visit Vitals    /72 (BP 1 Location: Left arm, BP Patient Position: Sitting)    Pulse 67    Ht 5' 4\" (1.626 m)    Wt 93.9 kg (207 lb)    SpO2 100%    BMI 35.53 kg/m2        Physical Exam:    General:  alert, cooperative, no distress, appears stated age   Lungs:   clear to auscultation bilaterally   Heart:  Regular rate and rhythm   Abdomen:   abdomen is soft without significant tenderness, masses, organomegaly or guarding; Incisions: healing well, no hernias         Assessment:     1.  History of Morbid obesity, status post  laparoscopic sleeve gastrectomy. Doing well; no concerns. .     Plan:     1. Remember to measure portions, continue low carbohydrate diet  2. Tolerated transition to solids without issue  3. Remember vitamin supplements. 4. Exercise regimen appears adequate. 5. Attend support group  6. Follow-up in 2 month(s). 7. Total time spent with the patient 20 minutes. Normal respiratory effort, diffuse expiratory wheezes throughout

## 2022-03-19 PROBLEM — K90.9 INTESTINAL MALABSORPTION: Status: ACTIVE | Noted: 2017-09-14

## 2022-03-19 PROBLEM — Z98.890 S/P GASTRIC SURGERY: Status: ACTIVE | Noted: 2017-09-14

## 2022-12-14 ENCOUNTER — OFFICE VISIT (OUTPATIENT)
Dept: SURGERY | Age: 67
End: 2022-12-14

## 2022-12-14 ENCOUNTER — HOSPITAL ENCOUNTER (OUTPATIENT)
Dept: LAB | Age: 67
Discharge: HOME | End: 2022-12-14
Payer: MEDICARE

## 2022-12-14 VITALS
TEMPERATURE: 97.3 F | SYSTOLIC BLOOD PRESSURE: 125 MMHG | WEIGHT: 145.8 LBS | BODY MASS INDEX: 24.89 KG/M2 | HEART RATE: 67 BPM | OXYGEN SATURATION: 100 % | RESPIRATION RATE: 16 BRPM | HEIGHT: 64 IN | DIASTOLIC BLOOD PRESSURE: 77 MMHG

## 2022-12-14 DIAGNOSIS — K90.9 INTESTINAL MALABSORPTION, UNSPECIFIED TYPE: ICD-10-CM

## 2022-12-14 DIAGNOSIS — K90.9 INTESTINAL MALABSORPTION, UNSPECIFIED TYPE: Primary | ICD-10-CM

## 2022-12-14 LAB
25(OH)D3 SERPL-MCNC: 10.6 NG/ML (ref 30–100)
ALBUMIN SERPL-MCNC: 3.7 G/DL (ref 3.4–5)
ALBUMIN/GLOB SERPL: 1.3 {RATIO} (ref 0.8–1.7)
ALP SERPL-CCNC: 80 U/L (ref 45–117)
ALT SERPL-CCNC: 16 U/L (ref 13–56)
ANION GAP SERPL CALC-SCNC: 4 MMOL/L (ref 3–18)
AST SERPL-CCNC: 11 U/L (ref 10–38)
BASOPHILS # BLD: 0 K/UL (ref 0–0.1)
BASOPHILS NFR BLD: 1 % (ref 0–2)
BILIRUB SERPL-MCNC: 0.4 MG/DL (ref 0.2–1)
BUN SERPL-MCNC: 19 MG/DL (ref 7–18)
BUN/CREAT SERPL: 28 (ref 12–20)
CALCIUM SERPL-MCNC: 9.3 MG/DL (ref 8.5–10.1)
CHLORIDE SERPL-SCNC: 108 MMOL/L (ref 100–111)
CO2 SERPL-SCNC: 29 MMOL/L (ref 21–32)
CREAT SERPL-MCNC: 0.67 MG/DL (ref 0.6–1.3)
DIFFERENTIAL METHOD BLD: ABNORMAL
EOSINOPHIL # BLD: 0.1 K/UL (ref 0–0.4)
EOSINOPHIL NFR BLD: 2 % (ref 0–5)
ERYTHROCYTE [DISTWIDTH] IN BLOOD BY AUTOMATED COUNT: 12.9 % (ref 11.6–14.5)
FERRITIN SERPL-MCNC: 42 NG/ML (ref 8–388)
FOLATE SERPL-MCNC: 8.4 NG/ML (ref 3.1–17.5)
GLOBULIN SER CALC-MCNC: 2.8 G/DL (ref 2–4)
GLUCOSE SERPL-MCNC: 83 MG/DL (ref 74–99)
HCT VFR BLD AUTO: 40.2 % (ref 35–45)
HGB BLD-MCNC: 13 G/DL (ref 12–16)
IMM GRANULOCYTES # BLD AUTO: 0 K/UL (ref 0–0.04)
IMM GRANULOCYTES NFR BLD AUTO: 0 % (ref 0–0.5)
IRON SERPL-MCNC: 91 UG/DL (ref 50–175)
LYMPHOCYTES # BLD: 1.3 K/UL (ref 0.9–3.6)
LYMPHOCYTES NFR BLD: 32 % (ref 21–52)
MCH RBC QN AUTO: 27.7 PG (ref 24–34)
MCHC RBC AUTO-ENTMCNC: 32.3 G/DL (ref 31–37)
MCV RBC AUTO: 85.7 FL (ref 78–100)
MONOCYTES # BLD: 0.3 K/UL (ref 0.05–1.2)
MONOCYTES NFR BLD: 8 % (ref 3–10)
NEUTS SEG # BLD: 2.3 K/UL (ref 1.8–8)
NEUTS SEG NFR BLD: 58 % (ref 40–73)
NRBC # BLD: 0 K/UL (ref 0–0.01)
NRBC BLD-RTO: 0 PER 100 WBC
PLATELET # BLD AUTO: 293 K/UL (ref 135–420)
PMV BLD AUTO: 9.1 FL (ref 9.2–11.8)
POTASSIUM SERPL-SCNC: 4.4 MMOL/L (ref 3.5–5.5)
PROT SERPL-MCNC: 6.5 G/DL (ref 6.4–8.2)
RBC # BLD AUTO: 4.69 M/UL (ref 4.2–5.3)
SODIUM SERPL-SCNC: 141 MMOL/L (ref 136–145)
VIT B12 SERPL-MCNC: 314 PG/ML (ref 211–911)
WBC # BLD AUTO: 4 K/UL (ref 4.6–13.2)

## 2022-12-14 PROCEDURE — 1123F ACP DISCUSS/DSCN MKR DOCD: CPT | Performed by: SPECIALIST

## 2022-12-14 PROCEDURE — 1090F PRES/ABSN URINE INCON ASSESS: CPT | Performed by: SPECIALIST

## 2022-12-14 PROCEDURE — G8536 NO DOC ELDER MAL SCRN: HCPCS | Performed by: SPECIALIST

## 2022-12-14 PROCEDURE — 82728 ASSAY OF FERRITIN: CPT

## 2022-12-14 PROCEDURE — 99214 OFFICE O/P EST MOD 30 MIN: CPT | Performed by: SPECIALIST

## 2022-12-14 PROCEDURE — 36415 COLL VENOUS BLD VENIPUNCTURE: CPT

## 2022-12-14 PROCEDURE — 3017F COLORECTAL CA SCREEN DOC REV: CPT | Performed by: SPECIALIST

## 2022-12-14 PROCEDURE — 82306 VITAMIN D 25 HYDROXY: CPT

## 2022-12-14 PROCEDURE — G8427 DOCREV CUR MEDS BY ELIG CLIN: HCPCS | Performed by: SPECIALIST

## 2022-12-14 PROCEDURE — 83540 ASSAY OF IRON: CPT

## 2022-12-14 PROCEDURE — 85025 COMPLETE CBC W/AUTO DIFF WBC: CPT

## 2022-12-14 PROCEDURE — G8417 CALC BMI ABV UP PARAM F/U: HCPCS | Performed by: SPECIALIST

## 2022-12-14 PROCEDURE — 82746 ASSAY OF FOLIC ACID SERUM: CPT

## 2022-12-14 PROCEDURE — G8510 SCR DEP NEG, NO PLAN REQD: HCPCS | Performed by: SPECIALIST

## 2022-12-14 PROCEDURE — 80053 COMPREHEN METABOLIC PANEL: CPT

## 2022-12-14 PROCEDURE — 1101F PT FALLS ASSESS-DOCD LE1/YR: CPT | Performed by: SPECIALIST

## 2022-12-14 PROCEDURE — 82607 VITAMIN B-12: CPT

## 2022-12-14 PROCEDURE — G8400 PT W/DXA NO RESULTS DOC: HCPCS | Performed by: SPECIALIST

## 2022-12-14 PROCEDURE — 84425 ASSAY OF VITAMIN B-1: CPT

## 2022-12-14 NOTE — PROGRESS NOTES
Subjective: Tanika Vidal  is a 69 y.o. female who presents for follow-up about 5.33 years following laparoscopic sleeve gastrectomy. She has lost a total of 99 pounds since surgery. Body mass index is 25.03 kg/m². . EBWL is (88%). The patient presents today to assess their progress toward their goal of weight loss and to address any issues that may be present. Today the patient and I have reviewed their diet and how appropriate their food choices are. The following issues have been identified - she is here after not being seen since November of 2019. She is up 20 lbs from her lowest weight of 125 lbs which is very frustrating for her as she desires to weight 135 lbs. Weight Loss Metrics 12/14/2022 11/20/2019 6/20/2018 3/14/2018 1/10/2018 10/27/2017 9/29/2017   Today's Wt 145 lb 12.8 oz 137 lb 152 lb 6.4 oz 165 lb 181 lb 207 lb 218 lb   BMI 25.03 kg/m2 23.52 kg/m2 26.16 kg/m2 28.32 kg/m2 31.07 kg/m2 35.53 kg/m2 37.42 kg/m2     . Surgery related complication: NA       She reports no real issues and denies vomiting, abdominal pain, fussiness, and difficulty breathing. Patients pain score: 0/10    Weight Loss Metrics 12/14/2022 11/20/2019 6/20/2018 3/14/2018 1/10/2018 10/27/2017 9/29/2017   Today's Wt 145 lb 12.8 oz 137 lb 152 lb 6.4 oz 165 lb 181 lb 207 lb 218 lb   BMI 25.03 kg/m2 23.52 kg/m2 26.16 kg/m2 28.32 kg/m2 31.07 kg/m2 35.53 kg/m2 37.42 kg/m2        The patient's exercise level: very active. Changes in her medical history and medications have been reviewed.     Patient Active Problem List   Diagnosis Code    Chronic back pain M54.9, G89.29    Arthritic-like pain M25.50    S/P gastric surgery Z98.890    Intestinal malabsorption K90.9     Past Medical History:   Diagnosis Date    Arthritic-like pain     Chronic back pain     Chronic pain     back pain    GERD (gastroesophageal reflux disease)     Hypercholesterolemia     Morbid obesity ([address]     Morbid obesity with body mass index of 40.0-49.9 ([facility] [address]     reji     has not perfomed a sleep study     Past Surgical History:   Procedure Laterality Date    HX LIPOSUCTION      HX TOTAL ABDOMINAL HYSTERECTOMY      NV LAP, VICENTE RESTRICT PROC, LONGITUDINAL GASTRECTOMY      8/29/2017          Review of Symptoms:     General - No history or complaints of unexpected fever or chills  Head/Neck - No history or complaints of headache or dizziness  Cardiac - No history or complaints of chest pain, palpitations, or shortness of breath  Pulmonary - No history or complaints of shortness of breath or productive cough  Gastrointestinal - as noted above  Genitourinary - No history or complaints of hematuria/dysuria or renal lithiasis  Musculoskeletal - No history or complaints of joint  muscular weakness  Hematologic - No history of any bleeding episodes  Neurologic - No history or complaints of  migraine headaches or neurologic symptoms    Objective:     Visit Vitals  /77 (BP 1 Location: Left upper arm, BP Patient Position: Sitting, BP Cuff Size: Adult long)   Pulse 67   Temp 97.3 °F (36.3 °C)   Resp 16   Ht 5' 4\" (1.626 m)   Wt 66.1 kg (145 lb 12.8 oz)   SpO2 100%   BMI 25.03 kg/m²         Physical Examination:     General appearance:  alert, cooperative, no distress, appears stated age   Mental status   alert, oriented to person, place, and time   Neck  supple, no significant adenopathy     Chest  clear to auscultation, no wheezes, rales or rhonchi, symmetric air entry   Heart  normal rate, regular rhythm, normal S1, S2, no murmurs, rubs, clicks or gallops    Abdomen: soft, nontender, nondistended, no masses or organomegaly   Incision:  healing well, no drainage, no erythema, no hernia, no seroma, no swelling, no dehiscence, incision well approximated      Neurological  alert, oriented, normal speech, no focal findings or movement disorder noted   Extremities peripheral pulses normal, no pedal edema, no clubbing or cyanosis         Lab Results   Component Value Date/Time    WBC 3.7 (L) 11/20/2019 10:36 AM    HGB 12.0 11/20/2019 10:36 AM    HCT 38.7 11/20/2019 10:36 AM    PLATELET 079 00/31/5568 10:36 AM    MCV 86.6 11/20/2019 10:36 AM     Lab Results   Component Value Date/Time    Sodium 145 03/14/2018 12:14 PM    Potassium 4.7 03/14/2018 12:14 PM    Chloride 106 03/14/2018 12:14 PM    CO2 31 03/14/2018 12:14 PM    Anion gap 8 03/14/2018 12:14 PM    Glucose 84 03/14/2018 12:14 PM    BUN 13 03/14/2018 12:14 PM    Creatinine 0.64 03/14/2018 12:14 PM    BUN/Creatinine ratio 20 03/14/2018 12:14 PM    GFR est AA >60 03/14/2018 12:14 PM    GFR est non-AA >60 03/14/2018 12:14 PM    Calcium 9.4 03/14/2018 12:14 PM    Bilirubin, total 0.4 03/14/2018 12:14 PM    Alk. phosphatase 84 03/14/2018 12:14 PM    Protein, total 6.8 03/14/2018 12:14 PM    Albumin 3.8 03/14/2018 12:14 PM    Globulin 3.0 03/14/2018 12:14 PM    A-G Ratio 1.3 03/14/2018 12:14 PM    ALT (SGPT) 13 03/14/2018 12:14 PM     Lab Results   Component Value Date/Time    Iron 108 11/20/2019 10:36 AM    Ferritin 85 11/20/2019 10:36 AM     Lab Results   Component Value Date/Time    Folate 12.0 11/20/2019 10:36 AM     Lab Results   Component Value Date/Time    Vitamin D 25-Hydroxy 35.6 11/20/2019 10:36 AM           Assessment:     1. History of Morbid obesity, status post  laparoscopic sleeve gastrectomy. The patient continues to do extremely well overall despite her 20 pound weight gain. I have discussed with her her BMI of 25 is perfect and she needs not to weigh any less. I have instructed her to simply look at her diet and increase her activity level if she desires a lower weight than what she is at today. .     Full routine labs ordered today     Plan:     Remember to measure portions, continue low carbohydrate diet  Continue to concentrate on protein intake meeting daily requirements  Remember vitamin supplements.  The importance of such was discussed regarding the malabsorptive issues that the surgery creates. Exercise regimen appears to be: adequate  Try and attend support group if feasible. Follow-up in 1 year(s). Lab reviewed and appropriate changes made. Total time spent with the patient 30 minutes.       Buster Bowser MD

## 2022-12-16 LAB — VIT B1 BLD-SCNC: 98.8 NMOL/L (ref 66.5–200)

## 2022-12-19 NOTE — PATIENT INSTRUCTIONS

## 2022-12-22 ENCOUNTER — TELEPHONE (OUTPATIENT)
Dept: SURGERY | Age: 67
End: 2022-12-22

## 2022-12-22 RX ORDER — ERGOCALCIFEROL 1.25 MG/1
50000 CAPSULE ORAL 2 TIMES WEEKLY
Qty: 8 CAPSULE | Refills: 11 | Status: SHIPPED | OUTPATIENT
Start: 2022-12-23

## 2023-07-19 NOTE — NURSE NAVIGATOR
Per MBSAQIP requirements:  Letter and email sent requesting follow up appointment. [facility]  [facility]   [address], [address] [facility]                 [facility] [place]  [facility]  [facility]      Dear Patient,    Your health is our main concern. It is important for your health to have follow-up lab work and to see your surgeon at 3 months, 6 months, 9 months and annually after your weight loss surgery. Additionally, the Department of Bariatric Surgery at our hospital is a member of the Metabolic and Bariatric Surgery Accreditation and Quality Improvement Program [facility]). As a participant in this program, we gather information on the outcomes of our patients after surgery. Please call the office for a follow up appointment at [phone]. If you have moved out of the area or have changed surgeons please call us and let us know the name of your doctor. Your health and feedback are important to us. We greatly appreciate your response.        Thank you,  Tim Suggs [facility] [address]

## 2024-01-17 ENCOUNTER — OFFICE VISIT (OUTPATIENT)
Age: 69
End: 2024-01-17

## 2024-01-17 VITALS
HEIGHT: 65 IN | WEIGHT: 151.4 LBS | TEMPERATURE: 97.1 F | HEART RATE: 66 BPM | DIASTOLIC BLOOD PRESSURE: 89 MMHG | OXYGEN SATURATION: 100 % | BODY MASS INDEX: 25.22 KG/M2 | SYSTOLIC BLOOD PRESSURE: 142 MMHG

## 2024-01-17 DIAGNOSIS — Z98.890 S/P GASTRIC SURGERY: ICD-10-CM

## 2024-01-17 DIAGNOSIS — K90.9 INTESTINAL MALABSORPTION, UNSPECIFIED TYPE: Primary | ICD-10-CM

## 2024-01-17 DIAGNOSIS — K90.9 INTESTINAL MALABSORPTION, UNSPECIFIED TYPE: ICD-10-CM

## 2024-01-17 DIAGNOSIS — Z90.3 HISTORY OF SLEEVE GASTRECTOMY: ICD-10-CM

## 2024-01-17 ASSESSMENT — PATIENT HEALTH QUESTIONNAIRE - PHQ9
SUM OF ALL RESPONSES TO PHQ QUESTIONS 1-9: 0
2. FEELING DOWN, DEPRESSED OR HOPELESS: 0
SUM OF ALL RESPONSES TO PHQ QUESTIONS 1-9: 0
1. LITTLE INTEREST OR PLEASURE IN DOING THINGS: 0
SUM OF ALL RESPONSES TO PHQ9 QUESTIONS 1 & 2: 0

## 2024-01-17 NOTE — PROGRESS NOTES
Subjective:     Oly Medel  is a 68 y.o. female who presents for follow-up about 6.42 years following sleeve resection. She has lost a total of 93 pounds since surgery.  Body mass index is 25.19 kg/m². The patient presents today to assess their progress toward their goal of weight loss and to address any issues that may be present.  Today the patient and I have reviewed their diet and how appropriate their food choices are.  The following issues have been identified  - no issues from a surgical standpoint.        1/17/2024     8:33 AM 1/17/2024     8:29 AM 12/14/2022     8:40 AM   Ambulatory Bariatric Summary   Systolic 142 145 125   Diastolic 89 88 77   Pulse  66 67   Temp  97.1 °F (36.2 °C)    Weight - Scale  151.4 145.8   Height  1.651 m (5' 5\") 1.626 m (5' 4\")   BMI  25.2 kg/m2 25.1 kg/m2   Weight - Scale  68.7 kg (151 lb 6.4 oz) 66.1 kg (145 lb 12.8 oz)   BMI (Calculated)  25.2 25.1     Surgery related complication: NA     She reports no real issues and denies vomiting, abdominal pain, diarrhea, and difficulty breathing.    Patients pain score: 0/10        1/17/2024     8:33 AM 1/17/2024     8:29 AM 12/14/2022     8:40 AM   Ambulatory Bariatric Summary   Systolic 142 145 125   Diastolic 89 88 77   Pulse  66 67   Temp  97.1 °F (36.2 °C)    Weight - Scale  151.4 145.8   Height  1.651 m (5' 5\") 1.626 m (5' 4\")   BMI  25.2 kg/m2 25.1 kg/m2   Weight - Scale  68.7 kg (151 lb 6.4 oz) 66.1 kg (145 lb 12.8 oz)   BMI (Calculated)  25.2 25.1        The patient's exercise level: moderately active.    Changes in her medical history and medications have been reviewed.    Patient Active Problem List   Diagnosis    Chronic back pain    Arthritic-like pain    Intestinal malabsorption    S/P gastric surgery     Past Medical History:   Diagnosis Date    Arthritic-like pain     Chronic back pain     Chronic pain     back pain    GERD (gastroesophageal reflux disease)     Hypercholesterolemia     Morbid obesity (HCC)

## 2024-01-17 NOTE — PATIENT INSTRUCTIONS
Body Mass Index: Care Instructions  Your Care Instructions    Body mass index (BMI) can help you see if your weight is raising your risk for health problems. It uses a formula to compare how much you weigh with how tall you are.  A BMI lower than 18.5 is considered underweight.  A BMI between 18.5 and 24.9 is considered healthy.  A BMI between 25 and 29.9 is considered overweight. A BMI of 30 or higher is considered obese.  If your BMI is in the normal range, it means that you have a lower risk for weight-related health problems. If your BMI is in the overweight or obese range, you may be at increased risk for weight-related health problems, such as high blood pressure, heart disease, stroke, arthritis or joint pain, and diabetes. If your BMI is in the underweight range, you may be at increased risk for health problems such as fatigue, lower protection (immunity) against illness, muscle loss, bone loss, hair loss, and hormone problems.  BMI is just one measure of your risk for weight-related health problems. You may be at higher risk for health problems if you are not active, you eat an unhealthy diet, or you drink too much alcohol or use tobacco products.  Follow-up care is a key part of your treatment and safety. Be sure to make and go to all appointments, and call your doctor if you are having problems. It's also a good idea to know your test results and keep a list of the medicines you take.  How can you care for yourself at home?  Practice healthy eating habits. This includes eating plenty of fruits, vegetables, whole grains, lean protein, and low-fat dairy.  If your doctor recommends it, get more exercise. Walking is a good choice. Bit by bit, increase the amount you walk every day. Try for at least 30 minutes on most days of the week.  Do not smoke. Smoking can increase your risk for health problems. If you need help quitting, talk to your doctor about stop-smoking programs and medicines. These can

## 2024-03-05 ENCOUNTER — HOSPITAL ENCOUNTER (OUTPATIENT)
Facility: HOSPITAL | Age: 69
Discharge: HOME | End: 2024-03-08

## 2024-03-05 LAB — LABCORP SPECIMEN COLLECTION: NORMAL

## 2024-03-06 LAB
25(OH)D3+25(OH)D2 SERPL-MCNC: 12.4 NG/ML (ref 30–100)
BASOPHILS # BLD AUTO: 0 X10E3/UL (ref 0–0.2)
BASOPHILS NFR BLD AUTO: 1 %
EOSINOPHIL # BLD AUTO: 0.1 X10E3/UL (ref 0–0.4)
EOSINOPHIL NFR BLD AUTO: 2 %
ERYTHROCYTE [DISTWIDTH] IN BLOOD BY AUTOMATED COUNT: 13.4 % (ref 11.7–15.4)
FERRITIN SERPL-MCNC: 62 NG/ML (ref 15–150)
HCT VFR BLD AUTO: 37.6 % (ref 34–46.6)
HGB BLD-MCNC: 12 G/DL (ref 11.1–15.9)
IMM GRANULOCYTES # BLD AUTO: 0 X10E3/UL (ref 0–0.1)
IMM GRANULOCYTES NFR BLD AUTO: 0 %
IRON SERPL-MCNC: 57 UG/DL (ref 27–139)
LYMPHOCYTES # BLD AUTO: 1.4 X10E3/UL (ref 0.7–3.1)
LYMPHOCYTES NFR BLD AUTO: 40 %
MCH RBC QN AUTO: 27.5 PG (ref 26.6–33)
MCHC RBC AUTO-ENTMCNC: 31.9 G/DL (ref 31.5–35.7)
MCV RBC AUTO: 86 FL (ref 79–97)
MONOCYTES # BLD AUTO: 0.3 X10E3/UL (ref 0.1–0.9)
MONOCYTES NFR BLD AUTO: 7 %
NEUTROPHILS # BLD AUTO: 1.8 X10E3/UL (ref 1.4–7)
NEUTROPHILS NFR BLD AUTO: 50 %
PLATELET # BLD AUTO: 289 X10E3/UL (ref 150–450)
RBC # BLD AUTO: 4.36 X10E6/UL (ref 3.77–5.28)
SPECIMEN STATUS REPORT: NORMAL
T4 FREE SERPL-MCNC: 1.34 NG/DL (ref 0.82–1.77)
TSH SERPL DL<=0.005 MIU/L-ACNC: 1.23 UIU/ML (ref 0.45–4.5)
WBC # BLD AUTO: 3.6 X10E3/UL (ref 3.4–10.8)

## 2024-03-07 ENCOUNTER — TELEPHONE (OUTPATIENT)
Age: 69
End: 2024-03-07

## 2024-03-07 LAB
FOLATE SERPL-MCNC: 9.2 NG/ML
VIT B12 SERPL-MCNC: 324 PG/ML (ref 232–1245)

## 2024-03-07 RX ORDER — ERGOCALCIFEROL 1.25 MG/1
50000 CAPSULE ORAL
Qty: 8 CAPSULE | Refills: 11 | Status: SHIPPED | OUTPATIENT
Start: 2024-03-07

## 2024-03-08 LAB — VIT B1 BLD-SCNC: 84.2 NMOL/L (ref 66.5–200)

## 2025-07-07 ENCOUNTER — CLINICAL DOCUMENTATION (OUTPATIENT)
Facility: HOSPITAL | Age: 70
End: 2025-07-07

## (undated) DEVICE — APPLIER CLP L SHFT DIA12MM 20 ROT MULT LIGACLP

## (undated) DEVICE — Device

## (undated) DEVICE — ENDO CARRY-ON PROCEDURE KIT INCLUDES ENZYMATIC SPONGE, GAUZE, BIOHAZARD LABEL, TRAY, LUBRICANT, DIRTY SCOPE LABEL, WATER LABEL, TRAY, DRAWSTRING PAD, AND DEFENDO 4-PIECE KIT.: Brand: ENDO CARRY-ON PROCEDURE KIT

## (undated) DEVICE — STAPLER SKIN L440MM 32MM LNG 12 FIRING B FRM PWR + GRIPPING

## (undated) DEVICE — AMD ANTIMICROBIAL DRAIN SPONGES, 6 PLY, 0.2% POLYHEXAMETHYLENE BIGUANIDE HCI (PHMB): Brand: EXCILON

## (undated) DEVICE — TROCAR ENDOSCP L100MM DIA12MM STBL SL BLDELSS ENDOPATH XCEL

## (undated) DEVICE — RELOAD STPL H4.1X2MM DIA60MM THCK TISS GRN 6 ROW PWR GST B

## (undated) DEVICE — FORCEPS BX OVL CUP SERR DISP CAP L 240CM RAD JAW 4

## (undated) DEVICE — 3L THIN WALL CAN: Brand: CRD

## (undated) DEVICE — TIP APPL L35CM RIG FOR SEAL EVICEL

## (undated) DEVICE — SOL IRRIGATION INJ NACL 0.9% 500ML BTL

## (undated) DEVICE — SEALANT HEMSTAT 5ML HUM FIBRIN THROM 2 VI APPL DEV EVICEL

## (undated) DEVICE — TROCAR ENDOSCP L100MM DIA15MM BLDELSS STBL SL ENDOPATH XCEL

## (undated) DEVICE — SHEAR HARMONIC 5MMX45CM -- ACE 7+

## (undated) DEVICE — TROCAR ENDOSCP BLDELSS 12X100 MM W/ HNDL STBL SL OPT TIP

## (undated) DEVICE — DRAIN SURG 15FR L3/16IN SIL RND 3/4 FLUT 3/16IN TRCR

## (undated) DEVICE — SYR IRR CATH TIP LR ADPT 70ML -- CONVERT TO ITEM 363120

## (undated) DEVICE — KENDALL SCD EXPRESS SLEEVES, KNEE LENGTH, MEDIUM: Brand: KENDALL SCD

## (undated) DEVICE — SUTURE PDS II SZ 0 L27IN ABSRB VLT L26MM CT-2 1/2 CIR Z334H

## (undated) DEVICE — MEDI-VAC NON-CONDUCTIVE SUCTION TUBING: Brand: CARDINAL HEALTH

## (undated) DEVICE — SUT MONOCRYL PLUS UD 4-0 --

## (undated) DEVICE — PREP SKN PREVAIL 40ML APPL --

## (undated) DEVICE — SUTURE ETHLN SZ 3-0 L30IN NONABSORBABLE BLK FSL L30MM 3/8 1671H

## (undated) DEVICE — 3M™ STERI-STRIP™ REINFORCED ADHESIVE SKIN CLOSURES, R1548, 1 IN X 5 IN (25 MM X 125 MM), 4 STRIPS/ENVELOPE: Brand: 3M™ STERI-STRIP™

## (undated) DEVICE — SOLUTION LACTATED RINGERS INJECTION USP

## (undated) DEVICE — NEEDLE INSUF L150MM DIA2MM DISP FOR PNEUMOPERI ENDOPATH

## (undated) DEVICE — TROCAR LAP L100MM DIA5MM BLDELSS W/ STBL SL ENDOPATH XCEL

## (undated) DEVICE — BARIATRIC: Brand: MEDLINE INDUSTRIES, INC.

## (undated) DEVICE — STERILE POLYISOPRENE POWDER-FREE SURGICAL GLOVES: Brand: PROTEXIS

## (undated) DEVICE — RELOAD STPL L60MM H1.5-3.6MM REG TISS BLU GRIPPING SURF B

## (undated) DEVICE — VISIGI 3D®  CALIBRATION SYSTEM  SIZE 36FR STD W/ BULB: Brand: BOEHRINGER® VISIGI 3D™ SLEEVE GASTRECTOMY CALIBRATION SYSTEM, SIZE 36FR W/BULB

## (undated) DEVICE — SUTURE ETHIB EXCL BR GRN TAPR PT 2-0 30 X563H X563H